# Patient Record
Sex: MALE | Race: WHITE | Employment: OTHER | URBAN - METROPOLITAN AREA
[De-identification: names, ages, dates, MRNs, and addresses within clinical notes are randomized per-mention and may not be internally consistent; named-entity substitution may affect disease eponyms.]

---

## 2017-04-07 ENCOUNTER — OFFICE VISIT (OUTPATIENT)
Dept: CARDIOLOGY CLINIC | Age: 67
End: 2017-04-07

## 2017-04-07 VITALS
HEIGHT: 69 IN | DIASTOLIC BLOOD PRESSURE: 100 MMHG | WEIGHT: 185 LBS | HEART RATE: 90 BPM | OXYGEN SATURATION: 98 % | BODY MASS INDEX: 27.4 KG/M2 | RESPIRATION RATE: 16 BRPM | SYSTOLIC BLOOD PRESSURE: 150 MMHG

## 2017-04-07 DIAGNOSIS — E78.5 DYSLIPIDEMIA: ICD-10-CM

## 2017-04-07 DIAGNOSIS — I25.119 CORONARY ARTERY DISEASE INVOLVING NATIVE CORONARY ARTERY WITH ANGINA PECTORIS, UNSPECIFIED WHETHER NATIVE OR TRANSPLANTED HEART (HCC): Primary | ICD-10-CM

## 2017-04-07 RX ORDER — SIMVASTATIN 20 MG/1
20 TABLET, FILM COATED ORAL
Qty: 90 TAB | Refills: 3 | Status: SHIPPED | OUTPATIENT
Start: 2017-04-07 | End: 2018-11-17

## 2017-04-07 RX ORDER — LANOLIN ALCOHOL/MO/W.PET/CERES
400 CREAM (GRAM) TOPICAL DAILY
COMMUNITY
End: 2021-03-09

## 2017-04-07 RX ORDER — BISMUTH SUBSALICYLATE 262 MG
1 TABLET,CHEWABLE ORAL DAILY
COMMUNITY

## 2017-04-07 NOTE — PROGRESS NOTES
Subjective:     Problem List  Date Reviewed: 4/7/2017          Codes Class Noted    CAD (coronary artery disease), native coronary artery ICD-10-CM: I25.10  ICD-9-CM: 414.01  10/28/2010    Overview Signed 11/15/2010  3:14 PM by Darrion Aceves MD     a. Cath (11/27/7): LM d20. LAD ost90, p95. RI p70. LCx p40. RCA p30; Right PDA ost30. EF 60% with mild mid/distal anterolat HK. No AVG/MR. Patent L SC.  b. CABG (11/28/7, Sunitha Bain): CARRINGTON-LAD, SVG-Diag, SVG-RI.  c. Echo (4/14/8): EF 60%. Mild MR/TR. PASP 35. Dyslipidemia ICD-10-CM: E78.5  ICD-9-CM: 272.4  10/28/2010    Overview Addendum 11/28/2011  1:43 PM by Darrion Aceves MD     a. FLP (11/4/9): Tot 142, TG 51, HDL 46, LDL 86 (Lipitor 10mg qd). B.  FLP (10/11/11): Tot 158, TG 68, HDL 56, LDL 88 (Zocor 20mg qd). H/O Nephrolithiasis ICD-10-CM: N20.0  ICD-9-CM: 592.0  11/15/2010              Mr. Shanda Nunes is a 77 y.o. man with the above past medical history, who presents for re-enrollment into a cardiology clinic. I last saw him about five years ago. He has been doing well from a cardiac standpoint. He denies any chest pain, chest discomfort, shortness of breath, dyspnea on exertion, orthopnea, paroxysmal nocturnal dyspnea, lower extremity swelling, palpitations, syncope or near syncope. History   Smoking Status    Never Smoker   Smokeless Tobacco    Not on file       Current Outpatient Prescriptions   Medication Sig Dispense Refill    magnesium oxide (MAG-OX) 400 mg tablet Take 400 mg by mouth daily.  multivitamin (ONE A DAY) tablet Take 1 Tab by mouth daily.  simvastatin (ZOCOR) 20 mg tablet Take 1 Tab by mouth nightly. 90 Tab 3    ascorbic acid (VITAMIN C) 500 mg tablet Take 1,000 mg by mouth.  aspirin (ASPIRIN) 325 mg tablet Take 325 mg by mouth daily.          Objective:     Visit Vitals    BP (!) 150/100 (BP 1 Location: Left arm, BP Patient Position: Sitting)    Pulse 90    Resp 16    Ht 5' 9\" (1.753 m)    Wt 185 lb (83.9 kg)    SpO2 98%    BMI 27.32 kg/m2       HEENT Exam:     Normocephalic, atraumatic. EOMI. Oropharynx negative. Neck supple. No lymphadenopathy. Lung Exam:     The patient is not dyspneic. There is no cough. The lungs are clear to percussion. Breath sounds are heard equally in all lung fields. There are no wheezes, rales, rhonchi, or rubs heard on auscultation. Heart Exam:     The rhythm is regular. The PMI is in the 5th intercostal space of the MCL. Apical impulse is normal. S1 is regular. S2 is physiologic. There is no S3, S4 gallop, murmur, click, or rub. Abdomen Exam:     Bowel sounds are normoactive. Abdomen benign. Extremities Exam:     The extremities are atraumatic appearing. There is no clubbing, cyanosis, edema, ulcers, varicose veins, rash, swelling, erythemia noted in the extremities. The neurovascular status is grossly intact with normal distal sensation and pulses. Vascular Exam:     The radial, brachial, dorsalis pedis, posterior tibial, are equal and strong bilaterally The carotids are equal bilaterally without bruits. EKG: NSR @ 90, no isch. Assessment/Plan:     Mr. Shanna Lowe overall appears stable from a cardiac standpoint. His blood pressure is elevated here today, but he says he has an automated cuff at home and his systolic blood pressure usually runs around 120 to 130/80's. He is going to come back in with his automated cuff to verify that it is accurate. I would like to see him again in one year's time for follow up. We are going to reassess his Simvastatin and we re going to check a fasting lipid profile with liver enzymes in the near future. We discussed diet and exercise. Plan:  1. Continue outpatient medication regimen. 2. Blood pressure check in the near future. 3. Fasting lipid profile with liver enzymes in the near future. 4. Follow up with me in one year's time. 5. Diet and exercise. 6. Call my office, call his primary care physician, or return to the hospital should any concerning symptomatology arise. Mr. Todd Cheung indicated that he understood this plan and wished to proceed ahead.             Patient Care Team:  BRITTANI Garber as PCP - General (Physician Assistant)  Tomy Jain MD [facility])

## 2017-04-07 NOTE — PATIENT INSTRUCTIONS
[employer] Activation    Thank you for requesting access to [employer]. Please follow the instructions below to securely access and download your online medical record. [employer] allows you to send messages to your doctor, view your test results, renew your prescriptions, schedule appointments, and more. How Do I Sign Up? 1. In your internet browser, go to [email or web address]  2. Click on the First Time User? Click Here link in the Sign In box. You will be redirect to the New Member Sign Up page. 3. Enter your [employer] Access Code exactly as it appears below. You will not need to use this code after youve completed the sign-up process. If you do not sign up before the expiration date, you must request a new code. [employer] Access Code: [email or web address]  Expires: 2017  2:32 PM (This is the date your [employer] access code will )    4. Enter the last four digits of your Social Security Number (xxxx) and Date of Birth (mm/dd/yyyy) as indicated and click Submit. You will be taken to the next sign-up page. 5. Create a [employer] ID. This will be your [employer] login ID and cannot be changed, so think of one that is secure and easy to remember. 6. Create a [employer] password. You can change your password at any time. 7. Enter your Password Reset Question and Answer. This can be used at a later time if you forget your password. 8. Enter your e-mail address. You will receive e-mail notification when new information is available in [address]. 9. Click Sign Up. You can now view and download portions of your medical record. 10. Click the Download Summary menu link to download a portable copy of your medical information. Additional Information    If you have questions, please visit the Frequently Asked Questions section of the [employer] website at [email or web address] Remember, [employer] is NOT to be used for urgent needs. For medical emergencies, dial 911.            Learning About Coronary Artery Disease (CAD)  What is coronary artery disease? Coronary artery disease (CAD) occurs when plaque builds up in the arteries that bring oxygen-rich blood to your heart. Plaque is a fatty substance made of cholesterol, calcium, and other substances in the blood. This process is called hardening of the arteries, or atherosclerosis. What happens when you have coronary artery disease? · Plaque may narrow the coronary arteries. Narrowed arteries cause poor blood flow. This can lead to angina symptoms such as chest pain or discomfort. If blood flow is completely blocked, you could have a heart attack. · You can slow CAD and reduce the risk of future problems by making changes in your lifestyle. These include quitting smoking and eating heart-healthy foods. · Treatments for CAD, along with changes in your lifestyle, can help you live a longer and healthier life. How can you prevent coronary artery disease? · Do not smoke. It may be the best thing you can do to prevent heart disease. If you need help quitting, talk to your doctor about stop-smoking programs and medicines. These can increase your chances of quitting for good. · Be active. Get at least 30 minutes of exercise on most days of the week. Walking is a good choice. You also may want to do other activities, such as running, swimming, cycling, or playing tennis or team sports. · Eat heart-healthy foods. Eat more fruits and vegetables and less foods that contain saturated and trans fats. Limit alcohol, sodium, and sweets. · Stay at a healthy weight. Lose weight if you need to. · Manage other health problems such as diabetes, high blood pressure, and high cholesterol. · Manage stress. Stress can hurt your heart. To keep stress low, talk about your problems and feelings. Don't keep your feelings hidden. · If you have talked about it with your doctor, take a low-dose aspirin every day.  Aspirin can help certain people lower their risk of a heart attack or stroke. But taking aspirin isn't right for everyone, because it can cause serious bleeding. Do not start taking daily aspirin unless your doctor knows about it. How is coronary artery disease treated? · Your doctor will suggest that you make lifestyle changes. For example, your doctor may ask you to eat healthy foods, quit smoking, lose extra weight, and be more active. · You will have to take medicines. · Your doctor may suggest a procedure to open narrowed or blocked arteries. This is called angioplasty. Or your doctor may suggest using healthy blood vessels to create detours around narrowed or blocked arteries. This is called bypass surgery. Follow-up care is a key part of your treatment and safety. Be sure to make and go to all appointments, and call your doctor if you are having problems. It's also a good idea to know your test results and keep a list of the medicines you take. Where can you learn more? Go to http://antonio-edith.info/. Enter [phone] in the search box to learn more about \"Learning About Coronary Artery Disease (CAD). \"  Current as of: January 27, 2016  Content Version: 11.2  © 4516-6487 "[employer]. Care instructions adapted under license by [employer] (which disclaims liability or warranty for this information). If you have questions about a medical condition or this instruction, always ask your healthcare professional. [address] any warranty or liability for your use of this information.

## 2017-04-07 NOTE — MR AVS SNAPSHOT
Visit Information Date & Time Provider Department Dept. Phone Encounter #  
 4/7/2017  2:40 PM Susie Toledo MD [facility] [phone] [id number] Your Appointments 4/14/2017  3:00 PM  
BLOOD PRESSURE with Susie Toledo MD  
[facility] ([facility] SCHEDULING) Appt Note: 1 WK BP  
 [address] [facility] [address]  
[phone]  
  
   
 [address] [facility] [address]  
  
    
 4/9/2018  3:00 PM  
ESTABLISHED PATIENT with Susie Toledo MD  
[facility] ([facility] SCHEDULING) Appt Note: ANNUAL  
 [address] [facility]  
[address] [facility] [address] [address] [address] Upcoming Health Maintenance Date Due Hepatitis C Screening 1950 DTaP/Tdap/Td series (1 - Tdap) 7/15/1971 FOBT Q 1 YEAR AGE 50-75 7/15/2000 ZOSTER VACCINE AGE 60> 7/15/2010 GLAUCOMA SCREENING Q2Y 7/15/2015 Pneumococcal 65+ Low/Medium Risk (1 of 2 - PCV13) 7/15/2015 MEDICARE YEARLY EXAM 7/15/2015 INFLUENZA AGE 9 TO ADULT 8/1/2016 Allergies as of 4/7/2017  Review Complete On: 4/7/2017 By: Susie Toledo MD  
 No Known Allergies Current Immunizations  Never Reviewed No immunizations on file. Not reviewed this visit You Were Diagnosed With   
  
 Codes Comments Coronary artery disease involving native coronary artery with angina pectoris, unspecified whether native or transplanted heart    -  Primary ICD-10-CM: I25.119 ICD-9-CM: 414.01, 413.9 Vitals BP Pulse Resp Height(growth percentile) Weight(growth percentile) SpO2  
 (!) 150/100 (BP 1 Location: Left arm, BP Patient Position: Sitting) 90 16 5' 9\" (1.753 m) 185 lb (83.9 kg) 98% BMI Smoking Status 27.32 kg/m2 Never Smoker Vitals History BMI and BSA Data Body Mass Index Body Surface Area  
 27.32 kg/m 2 2.02 m 2 Preferred Pharmacy Pharmacy Name Phone [facility] Shiraz LEYVA Marceline [phone] Your Updated Medication List  
  
   
This list is accurate as of: 4/7/17  2:53 PM.  Always use your most recent med list.  
  
  
  
  
 aspirin 325 mg tablet Commonly known as:  ASPIRIN Take 325 mg by mouth daily. magnesium oxide 400 mg tablet Commonly known as:  MAG-OX Take 400 mg by mouth daily. multivitamin tablet Commonly known as:  ONE A DAY Take 1 Tab by mouth daily. simvastatin 20 mg tablet Commonly known as:  ZOCOR Take 1 Tab by mouth nightly. VITAMIN C 500 mg tablet Generic drug:  ascorbic acid (vitamin C) Take 1,000 mg by mouth. We Performed the Following AMB POC EKG ROUTINE W/ 12 LEADS, INTER & REP [53079 CPT(R)] Introducing [facility] SERVICES! [facility] introduces [employer] patient portal. Now you can access parts of your medical record, email your doctor's office, and request medication refills online. 1. In your internet browser, go to [email or web address] 2. Click on the First Time User? Click Here link in the Sign In box. You will see the New Member Sign Up page. 3. Enter your [employer] Access Code exactly as it appears below. You will not need to use this code after youve completed the sign-up process. If you do not sign up before the expiration date, you must request a new code. · [employer] Access Code: [email or web address] Expires: 7/6/2017  2:32 PM 
 
4. Enter the last four digits of your Social Security Number (xxxx) and Date of Birth (mm/dd/yyyy) as indicated and click Submit. You will be taken to the next sign-up page. 5. Create a [employer] ID. This will be your [employer] login ID and cannot be changed, so think of one that is secure and easy to remember. 6. Create a [employer] password. You can change your password at any time. 7. Enter your Password Reset Question and Answer. This can be used at a later time if you forget your password. 8. Enter your e-mail address. You will receive e-mail notification when new information is available in [address] 9. Click Sign Up. You can now view and download portions of your medical record. 10. Click the Download Summary menu link to download a portable copy of your medical information. If you have questions, please visit the Frequently Asked Questions section of the [employer] website. Remember, [employer] is NOT to be used for urgent needs. For medical emergencies, dial 911. Now available from your iPhone and Android! Please provide this summary of care documentation to your next provider. Your primary care clinician is listed as Benjie Olmos. If you have any questions after today's visit, please call [phone].

## 2017-04-07 NOTE — PROGRESS NOTES
Visit Vitals    BP (!) 150/100 (BP 1 Location: Left arm, BP Patient Position: Sitting)    Pulse 89    Resp 16    Ht 5' 9\" (1.753 m)    Wt 185 lb (83.9 kg)    SpO2 98%    BMI 27.32 kg/m2

## 2017-04-14 ENCOUNTER — CLINICAL SUPPORT (OUTPATIENT)
Dept: CARDIOLOGY CLINIC | Age: 67
End: 2017-04-14

## 2017-04-14 VITALS
HEART RATE: 85 BPM | HEIGHT: 69 IN | OXYGEN SATURATION: 98 % | RESPIRATION RATE: 18 BRPM | SYSTOLIC BLOOD PRESSURE: 169 MMHG | DIASTOLIC BLOOD PRESSURE: 93 MMHG

## 2017-04-14 DIAGNOSIS — I25.10 CORONARY ARTERY DISEASE INVOLVING NATIVE CORONARY ARTERY OF NATIVE HEART WITHOUT ANGINA PECTORIS: Primary | ICD-10-CM

## 2017-04-19 ENCOUNTER — HOSPITAL ENCOUNTER (OUTPATIENT)
Dept: LAB | Age: 67
Discharge: HOME | End: 2017-04-19
Payer: MEDICARE

## 2017-04-19 PROCEDURE — 36415 COLL VENOUS BLD VENIPUNCTURE: CPT

## 2017-04-19 PROCEDURE — 80076 HEPATIC FUNCTION PANEL: CPT

## 2017-04-19 PROCEDURE — 80061 LIPID PANEL: CPT

## 2017-04-20 LAB
ALBUMIN SERPL-MCNC: 4.3 G/DL (ref 3.6–4.8)
ALP SERPL-CCNC: 65 IU/L (ref 39–117)
ALT SERPL-CCNC: 29 IU/L (ref 0–44)
AST SERPL-CCNC: 26 IU/L (ref 0–40)
BILIRUB DIRECT SERPL-MCNC: 0.18 MG/DL (ref 0–0.4)
BILIRUB SERPL-MCNC: 0.7 MG/DL (ref 0–1.2)
CHOLEST SERPL-MCNC: 170 MG/DL (ref 100–199)
HDLC SERPL-MCNC: 56 MG/DL
INTERPRETATION, 910389: NORMAL
LDLC SERPL CALC-MCNC: 94 MG/DL (ref 0–99)
PROT SERPL-MCNC: 6.8 G/DL (ref 6–8.5)
TRIGL SERPL-MCNC: 101 MG/DL (ref 0–149)
VLDLC SERPL CALC-MCNC: 20 MG/DL (ref 5–40)

## 2018-04-13 ENCOUNTER — OFFICE VISIT (OUTPATIENT)
Dept: CARDIOLOGY CLINIC | Age: 68
End: 2018-04-13

## 2018-04-13 VITALS
WEIGHT: 170 LBS | BODY MASS INDEX: 25.18 KG/M2 | HEART RATE: 94 BPM | DIASTOLIC BLOOD PRESSURE: 90 MMHG | SYSTOLIC BLOOD PRESSURE: 152 MMHG | HEIGHT: 69 IN

## 2018-04-13 DIAGNOSIS — E78.5 DYSLIPIDEMIA: ICD-10-CM

## 2018-04-13 DIAGNOSIS — I25.10 CORONARY ARTERY DISEASE INVOLVING NATIVE CORONARY ARTERY OF NATIVE HEART WITHOUT ANGINA PECTORIS: Primary | ICD-10-CM

## 2018-04-13 DIAGNOSIS — I10 ESSENTIAL HYPERTENSION: ICD-10-CM

## 2018-04-13 NOTE — PROGRESS NOTES
Tamica Gill MD. [facility]              Patient: Analy Apodaca  : 1950      Today's Date: 2018            HISTORY OF PRESENT ILLNESS:     History of Present Illness:  He is here for follow-up. Doing well. No complaints. No CP or SOB. Exercises. PAST MEDICAL HISTORY:     Past Medical History:   Diagnosis Date    CAD (coronary artery disease), native coronary artery 10/28/2010    Dyslipidemia 10/28/2010    Other and unspecified hyperlipidemia 10/28/2010    S/P CABG (status post coronary artery bypass graft) 10/28/2010             MEDICATIONS:     Current Outpatient Prescriptions   Medication Sig Dispense Refill    OTHER Indications: concoction of lemon,garlic, vinegar and christie      magnesium oxide (MAG-OX) 400 mg tablet Take 400 mg by mouth daily.  multivitamin (ONE A DAY) tablet Take 1 Tab by mouth daily.  simvastatin (ZOCOR) 20 mg tablet Take 1 Tab by mouth nightly. 90 Tab 3    ascorbic acid (VITAMIN C) 500 mg tablet Take 1,000 mg by mouth.  aspirin (ASPIRIN) 325 mg tablet Take 325 mg by mouth daily. No Known Allergies          SOCIAL HISTORY:     Social History   Substance Use Topics    Smoking status: Never Smoker    Smokeless tobacco: Never Used    Alcohol use No           FAMILY HISTORY:     Family History   Problem Relation Age of Onset    No Known Problems Mother                REVIEW OF SYMPTOMS:     Review of Symptoms:  Constitutional: Negative for fever, chills  HEENT: Negative for nosebleeds, tinnitus, and vision changes. Respiratory: Negative for cough, wheezing  Cardiovascular: Negative for orthopnea, claudication, syncope, and PND. Gastrointestinal: Negative for abdominal pain, diarrhea, melena. Genitourinary: Negative for dysuria  Musculoskeletal: Negative for myalgias. Skin: Negative for rash  Heme: No problems bleeding. Neurological: Negative for speech change and focal weakness.              PHYSICAL EXAM: Physical Exam:  Visit Vitals    /90    Pulse 94    Ht 5' 9\" (1.753 m)    Wt 170 lb (77.1 kg)    BMI 25.1 kg/m2     Patient appears generally well, mood and affect are appropriate and pleasant. HEENT:  Hearing intact, non-icteric, normocephalic, atraumatic. Neck Exam: Supple, No JVD or carotid bruits. Lung Exam: Clear to auscultation, even breath sounds. Cardiac Exam: Regular rate and rhythm with no murmur  Abdomen: Soft, non-tender, normal bowel sounds. No bruits or masses. Extremities: Moves all ext well. No lower extremity edema. Vascular: 2+ dorsalis pedis pulses bilaterally. Psych: Appropriate affect  Neuro - Grossly intact        LABS / OTHER STUDIES:       Lab Results   Component Value Date/Time    Bilirubin, total 0.7 04/19/2017 12:34 PM    AST (SGOT) 26 04/19/2017 12:34 PM    Alk. phosphatase 65 04/19/2017 12:34 PM    Protein, total 6.8 04/19/2017 12:34 PM    Albumin 4.3 04/19/2017 12:34 PM    ALT (SGPT) 29 04/19/2017 12:34 PM         Lab Results   Component Value Date/Time    Cholesterol, total 170 04/19/2017 12:34 PM    HDL Cholesterol 56 04/19/2017 12:34 PM    LDL, calculated 94 04/19/2017 12:34 PM    VLDL, calculated 20 04/19/2017 12:34 PM    Triglyceride 101 04/19/2017 12:34 PM               CARDIAC DIAGNOSTICS:     Cardiac Evaluation Includes:    a. Cath (11/27/7): LM d20. LAD ost90, p95. RI p70. LCx p40. RCA p30; Right PDA ost30. EF 60% with mild mid/distal anterolat HK. No AVG/MR. Patent L SC.  b. CABG (11/28/7, Maria G Dang): LIMA-LAD, SVG-Diag, SVG-RI.  c. Echo (4/14/8): EF 60%. Mild MR/TR. PASP 35. EKG 4/13/18 - NSR, normal           ASSESSMENT AND PLAN:     Assessment and Plan:  1) CAD (CABG 11/2007)   - Doing well without complaints.    - Exercises regularly   - Cont ASA and a statin     2) Dyslipidemia  - recheck labs   - cont statin     3) HTN   - BP high in the office, but he says BP OK at home  - He wants to work on diet and follow BP at home before adding meds  - See me in 2 months to reassess     4) Asked him to establish with a PCP. 5) See me back in two months. Patient expressed understanding of the plan - questions were answered. 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 Lorne Gutierrez MD, [address], [address] [place] [address]  Ph: [phone]    [phone]

## 2018-05-08 LAB
ALBUMIN SERPL-MCNC: 3.9 G/DL (ref 3.6–4.8)
ALBUMIN/GLOB SERPL: 1.5 {RATIO} (ref 1.2–2.2)
ALP SERPL-CCNC: 60 IU/L (ref 39–117)
ALT SERPL-CCNC: 21 IU/L (ref 0–44)
AST SERPL-CCNC: 22 IU/L (ref 0–40)
BILIRUB SERPL-MCNC: 0.6 MG/DL (ref 0–1.2)
BUN SERPL-MCNC: 16 MG/DL (ref 8–27)
BUN/CREAT SERPL: 16 (ref 10–24)
CALCIUM SERPL-MCNC: 9.1 MG/DL (ref 8.6–10.2)
CHLORIDE SERPL-SCNC: 100 MMOL/L (ref 96–106)
CHOLEST SERPL-MCNC: 160 MG/DL (ref 100–199)
CO2 SERPL-SCNC: 27 MMOL/L (ref 18–29)
CREAT SERPL-MCNC: 1.03 MG/DL (ref 0.76–1.27)
ERYTHROCYTE [DISTWIDTH] IN BLOOD BY AUTOMATED COUNT: 14.1 % (ref 12.3–15.4)
GFR SERPLBLD CREATININE-BSD FMLA CKD-EPI: 75 ML/MIN/1.73
GFR SERPLBLD CREATININE-BSD FMLA CKD-EPI: 86 ML/MIN/1.73
GLOBULIN SER CALC-MCNC: 2.6 G/DL (ref 1.5–4.5)
GLUCOSE SERPL-MCNC: 95 MG/DL (ref 65–99)
HCT VFR BLD AUTO: 44.4 % (ref 37.5–51)
HDLC SERPL-MCNC: 55 MG/DL
HGB BLD-MCNC: 15.3 G/DL (ref 13–17.7)
INTERPRETATION, 910389: NORMAL
LDLC SERPL CALC-MCNC: 90 MG/DL (ref 0–99)
MCH RBC QN AUTO: 31.7 PG (ref 26.6–33)
MCHC RBC AUTO-ENTMCNC: 34.5 G/DL (ref 31.5–35.7)
MCV RBC AUTO: 92 FL (ref 79–97)
PLATELET # BLD AUTO: 235 X10E3/UL (ref 150–379)
POTASSIUM SERPL-SCNC: 4.4 MMOL/L (ref 3.5–5.2)
PROT SERPL-MCNC: 6.5 G/DL (ref 6–8.5)
RBC # BLD AUTO: 4.83 X10E6/UL (ref 4.14–5.8)
SODIUM SERPL-SCNC: 140 MMOL/L (ref 134–144)
TRIGL SERPL-MCNC: 76 MG/DL (ref 0–149)
TSH SERPL DL<=0.005 MIU/L-ACNC: 1.45 UIU/ML (ref 0.45–4.5)
VLDLC SERPL CALC-MCNC: 15 MG/DL (ref 5–40)
WBC # BLD AUTO: 5.9 X10E3/UL (ref 3.4–10.8)

## 2018-11-17 ENCOUNTER — APPOINTMENT (OUTPATIENT)
Dept: GENERAL RADIOLOGY | Age: 68
End: 2018-11-17
Attending: EMERGENCY MEDICINE
Payer: MEDICARE

## 2018-11-17 ENCOUNTER — HOSPITAL ENCOUNTER (OUTPATIENT)
Age: 68
Setting detail: OBSERVATION
Discharge: HOME | End: 2018-11-19
Attending: EMERGENCY MEDICINE | Admitting: INTERNAL MEDICINE
Payer: MEDICARE

## 2018-11-17 DIAGNOSIS — I25.119 CORONARY ARTERY DISEASE INVOLVING NATIVE HEART WITH ANGINA PECTORIS, UNSPECIFIED VESSEL OR LESION TYPE (HCC): ICD-10-CM

## 2018-11-17 DIAGNOSIS — I10 HYPERTENSION, UNSPECIFIED TYPE: ICD-10-CM

## 2018-11-17 DIAGNOSIS — R07.9 CHEST PAIN, UNSPECIFIED TYPE: Primary | ICD-10-CM

## 2018-11-17 PROBLEM — I20.8 ANGINA AT REST (HCC): Status: ACTIVE | Noted: 2018-11-17

## 2018-11-17 LAB
ALBUMIN SERPL-MCNC: 3.8 G/DL (ref 3.5–5)
ALBUMIN/GLOB SERPL: 0.9 {RATIO} (ref 1.1–2.2)
ALP SERPL-CCNC: 93 U/L (ref 45–117)
ALT SERPL-CCNC: 31 U/L (ref 12–78)
ANION GAP SERPL CALC-SCNC: 10 MMOL/L (ref 5–15)
APTT PPP: 26.6 SEC (ref 22.1–32)
AST SERPL-CCNC: 22 U/L (ref 15–37)
BASOPHILS # BLD: 0.1 K/UL (ref 0–0.1)
BASOPHILS NFR BLD: 1 % (ref 0–1)
BILIRUB SERPL-MCNC: 0.3 MG/DL (ref 0.2–1)
BUN SERPL-MCNC: 17 MG/DL (ref 6–20)
BUN/CREAT SERPL: 20 (ref 12–20)
CALCIUM SERPL-MCNC: 8.9 MG/DL (ref 8.5–10.1)
CHLORIDE SERPL-SCNC: 102 MMOL/L (ref 97–108)
CO2 SERPL-SCNC: 27 MMOL/L (ref 21–32)
CREAT SERPL-MCNC: 0.87 MG/DL (ref 0.7–1.3)
DIFFERENTIAL METHOD BLD: NORMAL
EOSINOPHIL # BLD: 0.1 K/UL (ref 0–0.4)
EOSINOPHIL NFR BLD: 1 % (ref 0–7)
ERYTHROCYTE [DISTWIDTH] IN BLOOD BY AUTOMATED COUNT: 12.6 % (ref 11.5–14.5)
GLOBULIN SER CALC-MCNC: 4.2 G/DL (ref 2–4)
GLUCOSE SERPL-MCNC: 155 MG/DL (ref 65–100)
HCT VFR BLD AUTO: 46.7 % (ref 36.6–50.3)
HGB BLD-MCNC: 16 G/DL (ref 12.1–17)
IMM GRANULOCYTES # BLD: 0 K/UL (ref 0–0.04)
IMM GRANULOCYTES NFR BLD AUTO: 0 % (ref 0–0.5)
INR PPP: 0.9 (ref 0.9–1.1)
LIPASE SERPL-CCNC: 126 U/L (ref 73–393)
LYMPHOCYTES # BLD: 2.2 K/UL (ref 0.8–3.5)
LYMPHOCYTES NFR BLD: 26 % (ref 12–49)
MCH RBC QN AUTO: 31.6 PG (ref 26–34)
MCHC RBC AUTO-ENTMCNC: 34.3 G/DL (ref 30–36.5)
MCV RBC AUTO: 92.3 FL (ref 80–99)
MONOCYTES # BLD: 0.5 K/UL (ref 0–1)
MONOCYTES NFR BLD: 6 % (ref 5–13)
NEUTS SEG # BLD: 5.3 K/UL (ref 1.8–8)
NEUTS SEG NFR BLD: 65 % (ref 32–75)
NRBC # BLD: 0 K/UL (ref 0–0.01)
NRBC BLD-RTO: 0 PER 100 WBC
PLATELET # BLD AUTO: 233 K/UL (ref 150–400)
PMV BLD AUTO: 8.9 FL (ref 8.9–12.9)
POTASSIUM SERPL-SCNC: 3.7 MMOL/L (ref 3.5–5.1)
PROT SERPL-MCNC: 8 G/DL (ref 6.4–8.2)
PROTHROMBIN TIME: 9.6 SEC (ref 9–11.1)
RBC # BLD AUTO: 5.06 M/UL (ref 4.1–5.7)
SODIUM SERPL-SCNC: 139 MMOL/L (ref 136–145)
THERAPEUTIC RANGE,PTTT: NORMAL SECS (ref 58–77)
TROPONIN I BLD-MCNC: <0.04 NG/ML (ref 0–0.08)
WBC # BLD AUTO: 8.1 K/UL (ref 4.1–11.1)

## 2018-11-17 PROCEDURE — 65390000012 HC CONDITION CODE 44 OBSERVATION

## 2018-11-17 PROCEDURE — 74011000250 HC RX REV CODE- 250: Performed by: INTERNAL MEDICINE

## 2018-11-17 PROCEDURE — 74011250637 HC RX REV CODE- 250/637: Performed by: EMERGENCY MEDICINE

## 2018-11-17 PROCEDURE — 74011250637 HC RX REV CODE- 250/637: Performed by: INTERNAL MEDICINE

## 2018-11-17 PROCEDURE — 94762 N-INVAS EAR/PLS OXIMTRY CONT: CPT

## 2018-11-17 PROCEDURE — 80053 COMPREHEN METABOLIC PANEL: CPT

## 2018-11-17 PROCEDURE — 96374 THER/PROPH/DIAG INJ IV PUSH: CPT

## 2018-11-17 PROCEDURE — 71046 X-RAY EXAM CHEST 2 VIEWS: CPT

## 2018-11-17 PROCEDURE — 93005 ELECTROCARDIOGRAM TRACING: CPT

## 2018-11-17 PROCEDURE — 99285 EMERGENCY DEPT VISIT HI MDM: CPT

## 2018-11-17 PROCEDURE — 65270000029 HC RM PRIVATE

## 2018-11-17 PROCEDURE — 83690 ASSAY OF LIPASE: CPT

## 2018-11-17 PROCEDURE — 85730 THROMBOPLASTIN TIME PARTIAL: CPT

## 2018-11-17 PROCEDURE — 85610 PROTHROMBIN TIME: CPT

## 2018-11-17 PROCEDURE — 84484 ASSAY OF TROPONIN QUANT: CPT

## 2018-11-17 PROCEDURE — 85025 COMPLETE CBC W/AUTO DIFF WBC: CPT

## 2018-11-17 PROCEDURE — 36415 COLL VENOUS BLD VENIPUNCTURE: CPT

## 2018-11-17 RX ORDER — MORPHINE SULFATE 4 MG/ML
2 INJECTION INTRAVENOUS
Status: DISCONTINUED | OUTPATIENT
Start: 2018-11-17 | End: 2018-11-19 | Stop reason: HOSPADM

## 2018-11-17 RX ORDER — ACETAMINOPHEN 325 MG/1
650 TABLET ORAL
Status: DISCONTINUED | OUTPATIENT
Start: 2018-11-17 | End: 2018-11-19 | Stop reason: HOSPADM

## 2018-11-17 RX ORDER — SODIUM CHLORIDE 0.9 % (FLUSH) 0.9 %
5-10 SYRINGE (ML) INJECTION AS NEEDED
Status: DISCONTINUED | OUTPATIENT
Start: 2018-11-17 | End: 2018-11-19 | Stop reason: HOSPADM

## 2018-11-17 RX ORDER — DIPHENHYDRAMINE HYDROCHLORIDE 50 MG/ML
12.5 INJECTION, SOLUTION INTRAMUSCULAR; INTRAVENOUS
Status: DISCONTINUED | OUTPATIENT
Start: 2018-11-17 | End: 2018-11-19 | Stop reason: HOSPADM

## 2018-11-17 RX ORDER — HYDROCODONE BITARTRATE AND ACETAMINOPHEN 5; 325 MG/1; MG/1
1 TABLET ORAL
Status: DISCONTINUED | OUTPATIENT
Start: 2018-11-17 | End: 2018-11-19 | Stop reason: HOSPADM

## 2018-11-17 RX ORDER — ENOXAPARIN SODIUM 100 MG/ML
40 INJECTION SUBCUTANEOUS EVERY 24 HOURS
Status: DISCONTINUED | OUTPATIENT
Start: 2018-11-18 | End: 2018-11-19 | Stop reason: HOSPADM

## 2018-11-17 RX ORDER — NALOXONE HYDROCHLORIDE 0.4 MG/ML
0.4 INJECTION, SOLUTION INTRAMUSCULAR; INTRAVENOUS; SUBCUTANEOUS AS NEEDED
Status: DISCONTINUED | OUTPATIENT
Start: 2018-11-17 | End: 2018-11-19 | Stop reason: HOSPADM

## 2018-11-17 RX ORDER — SODIUM CHLORIDE 0.9 % (FLUSH) 0.9 %
5-10 SYRINGE (ML) INJECTION EVERY 8 HOURS
Status: DISCONTINUED | OUTPATIENT
Start: 2018-11-17 | End: 2018-11-19 | Stop reason: HOSPADM

## 2018-11-17 RX ORDER — LABETALOL HYDROCHLORIDE 5 MG/ML
20 INJECTION, SOLUTION INTRAVENOUS
Status: DISCONTINUED | OUTPATIENT
Start: 2018-11-17 | End: 2018-11-19 | Stop reason: HOSPADM

## 2018-11-17 RX ORDER — METOPROLOL TARTRATE 25 MG/1
25 TABLET, FILM COATED ORAL EVERY 12 HOURS
Status: DISCONTINUED | OUTPATIENT
Start: 2018-11-17 | End: 2018-11-19

## 2018-11-17 RX ORDER — ONDANSETRON 2 MG/ML
4 INJECTION INTRAMUSCULAR; INTRAVENOUS
Status: DISCONTINUED | OUTPATIENT
Start: 2018-11-17 | End: 2018-11-19 | Stop reason: HOSPADM

## 2018-11-17 RX ORDER — ASPIRIN 325 MG
325 TABLET ORAL DAILY
Status: DISCONTINUED | OUTPATIENT
Start: 2018-11-18 | End: 2018-11-19 | Stop reason: HOSPADM

## 2018-11-17 RX ORDER — DOCUSATE SODIUM 100 MG/1
100 CAPSULE, LIQUID FILLED ORAL 2 TIMES DAILY
Status: DISCONTINUED | OUTPATIENT
Start: 2018-11-18 | End: 2018-11-19 | Stop reason: HOSPADM

## 2018-11-17 RX ADMIN — Medication 10 ML: at 23:15

## 2018-11-17 RX ADMIN — NITROGLYCERIN 1 INCH: 20 OINTMENT TOPICAL at 20:43

## 2018-11-17 RX ADMIN — METOPROLOL TARTRATE 25 MG: 25 TABLET ORAL at 21:33

## 2018-11-17 RX ADMIN — LABETALOL HYDROCHLORIDE 20 MG: 5 INJECTION INTRAVENOUS at 21:33

## 2018-11-18 PROBLEM — R07.9 CHEST PAIN: Status: ACTIVE | Noted: 2018-11-18

## 2018-11-18 LAB
ALBUMIN SERPL-MCNC: 3.4 G/DL (ref 3.5–5)
ALBUMIN/GLOB SERPL: 1 {RATIO} (ref 1.1–2.2)
ALP SERPL-CCNC: 77 U/L (ref 45–117)
ALT SERPL-CCNC: 26 U/L (ref 12–78)
ANION GAP SERPL CALC-SCNC: 9 MMOL/L (ref 5–15)
AST SERPL-CCNC: 21 U/L (ref 15–37)
ATRIAL RATE: 90 BPM
BILIRUB DIRECT SERPL-MCNC: 0.1 MG/DL (ref 0–0.2)
BILIRUB SERPL-MCNC: 0.4 MG/DL (ref 0.2–1)
BUN SERPL-MCNC: 14 MG/DL (ref 6–20)
BUN/CREAT SERPL: 19 (ref 12–20)
CALCIUM SERPL-MCNC: 8.6 MG/DL (ref 8.5–10.1)
CALCULATED P AXIS, ECG09: 27 DEGREES
CALCULATED R AXIS, ECG10: 8 DEGREES
CALCULATED T AXIS, ECG11: 63 DEGREES
CHLORIDE SERPL-SCNC: 104 MMOL/L (ref 97–108)
CHOLEST SERPL-MCNC: 219 MG/DL
CK MB CFR SERPL CALC: 1.5 % (ref 0–2.5)
CK MB SERPL-MCNC: 1.2 NG/ML (ref 5–25)
CK SERPL-CCNC: 80 U/L (ref 39–308)
CO2 SERPL-SCNC: 26 MMOL/L (ref 21–32)
CREAT SERPL-MCNC: 0.72 MG/DL (ref 0.7–1.3)
DIAGNOSIS, 93000: NORMAL
ERYTHROCYTE [DISTWIDTH] IN BLOOD BY AUTOMATED COUNT: 12.7 % (ref 11.5–14.5)
EST. AVERAGE GLUCOSE BLD GHB EST-MCNC: 114 MG/DL
GLOBULIN SER CALC-MCNC: 3.5 G/DL (ref 2–4)
GLUCOSE SERPL-MCNC: 111 MG/DL (ref 65–100)
HBA1C MFR BLD: 5.6 % (ref 4.2–6.3)
HCT VFR BLD AUTO: 42.5 % (ref 36.6–50.3)
HDLC SERPL-MCNC: 57 MG/DL
HDLC SERPL: 3.8 {RATIO} (ref 0–5)
HGB BLD-MCNC: 14.7 G/DL (ref 12.1–17)
LDLC SERPL CALC-MCNC: 147 MG/DL (ref 0–100)
LIPID PROFILE,FLP: ABNORMAL
MAGNESIUM SERPL-MCNC: 2.1 MG/DL (ref 1.6–2.4)
MCH RBC QN AUTO: 31.6 PG (ref 26–34)
MCHC RBC AUTO-ENTMCNC: 34.6 G/DL (ref 30–36.5)
MCV RBC AUTO: 91.4 FL (ref 80–99)
NRBC # BLD: 0 K/UL (ref 0–0.01)
NRBC BLD-RTO: 0 PER 100 WBC
P-R INTERVAL, ECG05: 172 MS
PHOSPHATE SERPL-MCNC: 3.5 MG/DL (ref 2.6–4.7)
PLATELET # BLD AUTO: 214 K/UL (ref 150–400)
PMV BLD AUTO: 9 FL (ref 8.9–12.9)
POTASSIUM SERPL-SCNC: 4 MMOL/L (ref 3.5–5.1)
PROT SERPL-MCNC: 6.9 G/DL (ref 6.4–8.2)
Q-T INTERVAL, ECG07: 376 MS
QRS DURATION, ECG06: 108 MS
QTC CALCULATION (BEZET), ECG08: 459 MS
RBC # BLD AUTO: 4.65 M/UL (ref 4.1–5.7)
SODIUM SERPL-SCNC: 139 MMOL/L (ref 136–145)
TRIGL SERPL-MCNC: 75 MG/DL (ref ?–150)
TROPONIN I SERPL-MCNC: 0.07 NG/ML
TROPONIN I SERPL-MCNC: 0.07 NG/ML
VENTRICULAR RATE, ECG03: 90 BPM
VLDLC SERPL CALC-MCNC: 15 MG/DL
WBC # BLD AUTO: 7.4 K/UL (ref 4.1–11.1)

## 2018-11-18 PROCEDURE — 82550 ASSAY OF CK (CPK): CPT

## 2018-11-18 PROCEDURE — 99218 HC RM OBSERVATION: CPT

## 2018-11-18 PROCEDURE — 74011250637 HC RX REV CODE- 250/637: Performed by: INTERNAL MEDICINE

## 2018-11-18 PROCEDURE — 36415 COLL VENOUS BLD VENIPUNCTURE: CPT

## 2018-11-18 PROCEDURE — 74011250636 HC RX REV CODE- 250/636: Performed by: INTERNAL MEDICINE

## 2018-11-18 PROCEDURE — 65390000012 HC CONDITION CODE 44 OBSERVATION

## 2018-11-18 PROCEDURE — 80048 BASIC METABOLIC PNL TOTAL CA: CPT

## 2018-11-18 PROCEDURE — 83735 ASSAY OF MAGNESIUM: CPT

## 2018-11-18 PROCEDURE — 80076 HEPATIC FUNCTION PANEL: CPT

## 2018-11-18 PROCEDURE — 96372 THER/PROPH/DIAG INJ SC/IM: CPT

## 2018-11-18 PROCEDURE — 85027 COMPLETE CBC AUTOMATED: CPT

## 2018-11-18 PROCEDURE — 84100 ASSAY OF PHOSPHORUS: CPT

## 2018-11-18 PROCEDURE — G8980 MOBILITY D/C STATUS: HCPCS

## 2018-11-18 PROCEDURE — 97161 PT EVAL LOW COMPLEX 20 MIN: CPT

## 2018-11-18 PROCEDURE — 83036 HEMOGLOBIN GLYCOSYLATED A1C: CPT

## 2018-11-18 PROCEDURE — 80061 LIPID PANEL: CPT

## 2018-11-18 PROCEDURE — G8979 MOBILITY GOAL STATUS: HCPCS

## 2018-11-18 PROCEDURE — G8978 MOBILITY CURRENT STATUS: HCPCS

## 2018-11-18 PROCEDURE — 84484 ASSAY OF TROPONIN QUANT: CPT

## 2018-11-18 RX ORDER — ATORVASTATIN CALCIUM 20 MG/1
40 TABLET, FILM COATED ORAL DAILY
Status: DISCONTINUED | OUTPATIENT
Start: 2018-11-18 | End: 2018-11-19 | Stop reason: HOSPADM

## 2018-11-18 RX ADMIN — ATORVASTATIN CALCIUM 40 MG: 20 TABLET, FILM COATED ORAL at 17:59

## 2018-11-18 RX ADMIN — Medication 10 ML: at 21:30

## 2018-11-18 RX ADMIN — METOPROLOL TARTRATE 25 MG: 25 TABLET ORAL at 08:55

## 2018-11-18 RX ADMIN — ENOXAPARIN SODIUM 40 MG: 40 INJECTION SUBCUTANEOUS at 08:55

## 2018-11-18 RX ADMIN — Medication 10 ML: at 17:59

## 2018-11-18 RX ADMIN — DOCUSATE SODIUM 100 MG: 100 CAPSULE, LIQUID FILLED ORAL at 08:55

## 2018-11-18 RX ADMIN — METOPROLOL TARTRATE 25 MG: 25 TABLET ORAL at 20:00

## 2018-11-18 RX ADMIN — ASPIRIN 325 MG: 325 TABLET, COATED ORAL at 08:55

## 2018-11-18 RX ADMIN — DOCUSATE SODIUM 100 MG: 100 CAPSULE, LIQUID FILLED ORAL at 17:59

## 2018-11-18 NOTE — PROGRESS NOTES
[facility] Medical Progress Note NAME: Taj Lacy :  1950 MRM:  [id number] Date/Time: 2018  2:54 PM 
 
  
Assessment and Plan:  
 
 75 yo hx of HTN, CAD s/p CABG, presented w/ chest pain concerning for angina   
 1) Angina at rest/CAD: has CABG 12 years ago, but stopped taking statin, BB. Angina with exertion for past week. Initial EKG and enzymes neg, and now with mild troponinemia but no further pain. Repeat troponin and if uptrending sharply or repeat pain, will anticoagulate. Awaiting cardiology consult. TTE pending. Metoprolol added back. Continue ASA. LDL not at goal, restart high potency statin. NPO at MN 
  
2) Dyslipidemia: patient stopped simvastatin several months ago. LDL greater than 140. Restart statin.   
3) Uncontrolled HTN: Restart metoprolol. Use IV labetalol prn. Will likely need a secondary agent but can titrate slowly Subjective: Chief Complaint:  Patient seen and examined. Chart reviewed. Denies any further chest pain ROS: 
(bold if positive, if negative) Tolerating PT  Tolerating Diet Objective:  
 
Last 24hrs VS reviewed since prior progress note. Most recent are: 
 
Visit Vitals BP (!) 143/91 (BP 1 Location: Left arm, BP Patient Position: At rest;Supine; Head of bed elevated (Comment degrees)) Pulse 74 Temp 97.7 °F (36.5 °C) Resp 16 Ht 5' 9\" (1.753 m) Wt 83.9 kg (185 lb) SpO2 95% BMI 27.32 kg/m² SpO2 Readings from Last 6 Encounters:  
18 95% 17 98% 17 98% No intake or output data in the 24 hours ending 18 5404 Physical Exam: 
 
Gen:  Well-developed, well-nourished, in no acute distress HEENT:  Pink conjunctivae, PERRL, hearing intact to voice, moist mucous membranes Neck:  Supple, without masses, thyroid non-tender Resp:  No accessory muscle use, clear breath sounds without wheezes rales or rhonchi Card:  No murmurs, normal S1, S2 without thrills, bruits or peripheral edema Abd:  Soft, non-tender, non-distended, normoactive bowel sounds are present, no palpable organomegaly and no detectable hernias Lymph:  No cervical or inguinal adenopathy Musc:  No cyanosis or clubbing Skin:  No rashes or ulcers, skin turgor is good Neuro:  Cranial nerves are grossly intact, no focal motor weakness, follows commands appropriately Psych:  Good insight, oriented to person, place and time, alert Telemetry reviewed:   normal sinus rhythm 
__________________________________________________________________ Medications Reviewed: (see below) Medications:  
 
Current Facility-Administered Medications Medication Dose Route Frequency  labetalol (NORMODYNE;TRANDATE) injection 20 mg  20 mg IntraVENous Q4H PRN  
 nitroglycerin (NITROBID) 2 % ointment 0.5 Inch  0.5 Inch Topical BID PRN  
 metoprolol tartrate (LOPRESSOR) tablet 25 mg  25 mg Oral Q12H  
 sodium chloride (NS) flush 5-10 mL  5-10 mL IntraVENous Q8H  
 sodium chloride (NS) flush 5-10 mL  5-10 mL IntraVENous PRN  
 acetaminophen (TYLENOL) tablet 650 mg  650 mg Oral Q4H PRN  
 HYDROcodone-acetaminophen (NORCO) 5-325 mg per tablet 1 Tab  1 Tab Oral Q4H PRN  
 naloxone (NARCAN) injection 0.4 mg  0.4 mg IntraVENous PRN  
 diphenhydrAMINE (BENADRYL) injection 12.5 mg  12.5 mg IntraVENous Q4H PRN  
 ondansetron (ZOFRAN) injection 4 mg  4 mg IntraVENous Q6H PRN  
 docusate sodium (COLACE) capsule 100 mg  100 mg Oral BID  morphine injection 2 mg  2 mg IntraVENous Q4H PRN  
 enoxaparin (LOVENOX) injection 40 mg  40 mg SubCUTAneous Q24H  
 aspirin tablet 325 mg  325 mg Oral DAILY Lab Data Reviewed: (see below) Lab Review:  
 
Recent Labs 11/18/18 
0335 11/17/18 2012 WBC 7.4 8.1 HGB 14.7 16.0 HCT 42.5 46.7  233 Recent Labs 11/18/18 
0335 11/17/18 2012  139  
K 4.0 3.7  102 CO2 26 27 * 155* BUN 14 17 CREA 0.72 0.87 CA 8.6 8.9 MG 2.1  --   
PHOS 3.5  --   
ALB 3.4* 3.8 TBILI 0.4 0.3 SGOT 21 22 ALT 26 31 INR  --  0.9 No results found for: Marilyn Mera No results for input(s): PH, PCO2, PO2, HCO3, FIO2 in the last 72 hours. Recent Labs 11/17/18 2012 INR 0.9 All Micro Results None I have reviewed notes of prior 24hr. Other pertinent lab: None Total time spent with patient: 35 min Care Plan discussed with: Patient and Nursing Staff Discussed:  Care Plan Prophylaxis:  Lovenox Disposition:  Home w/Family 
        
___________________________________________________ Attending Physician: Teresa Pitts DO

## 2018-11-18 NOTE — ED NOTES
Troponin of 0.07 noted, Dr. Dixon Willams notified, no new orders received at this time. Pt in no acute distress and denies pain.

## 2018-11-18 NOTE — CONSULTS
Kaveh Redding MD. Formerly [facility] [place]              Patient: Mikhail Velez  : 1950      Today's Date: 2018        HISTORY OF PRESENT ILLNESS:     History of Present Illness:  Reason for consult: angina     The patient is a 75 yo hx of HTN, CAD s/p CABG, presented w/ chest pain concerning for angina. The patient had a CABG 12 years ago and has been doing well until recently. Over the past week or so, he started to have midsternal chest pain with exertion. The pain comes on sometimes with heavy exertion and sometimes with less. It is a moderate pressure associated with dyspnea and better with rest.  He denies any rest pain. In the ED, initial EKG and trop were normal.   He does note he stopped his statin months ago since he was worried about risks associated with it (denies myalgias). PAST MEDICAL HISTORY:     Past Medical History:   Diagnosis Date    CAD (coronary artery disease), native coronary artery 10/28/2010    Dyslipidemia 10/28/2010    Other and unspecified hyperlipidemia 10/28/2010    S/P CABG (status post coronary artery bypass graft) 10/28/2010         MEDICATIONS:     Prior to Admission Medications:   Prior to Admission Medications   Prescriptions Last Dose Informant Patient Reported? Taking? OTHER 11/10/2018 at Unknown time   Yes Yes   Sig: Mixture of lemon,garlic, vinegar and christie - takes daily   Omega-3 Fatty Acids (FISH OIL) 500 mg cap 11/10/2018 at Unknown time   Yes Yes   Sig: Take 1,000 mg by mouth daily. ascorbic acid (VITAMIN C) 500 mg tablet 2018 at am   Yes Yes   Sig: Take 1,000 mg by mouth daily. aspirin (ASPIRIN) 325 mg tablet 2018 at 1800   Yes Yes   Sig: Take 325 mg by mouth daily. garlic cap  at Unknown time   Yes Yes   Sig: Take 1 Cap by mouth daily. magnesium oxide (MAG-OX) 400 mg tablet 11/15/2018 at am   Yes Yes   Sig: Take 400 mg by mouth daily.    multivitamin (ONE A DAY) tablet 2018 at am   Yes Yes   Sig: Take 1 Tab by mouth daily. Facility-Administered Medications: None            No Known Allergies          SOCIAL HISTORY:     Social History     Tobacco Use    Smoking status: Never Smoker    Smokeless tobacco: Never Used   Substance Use Topics    Alcohol use: No    Drug use: Not on file           FAMILY HISTORY:     Family History   Problem Relation Age of Onset    No Known Problems Mother     Hypertension Father              REVIEW OF SYMPTOMS:     Review of Symptoms:  Constitutional: Negative for fever, chills  HEENT: Negative for nosebleeds, tinnitus, and vision changes. Respiratory: Negative for cough, wheezing  Cardiovascular: Negative for orthopnea, claudication, syncope, and PND. Gastrointestinal: Negative for abdominal pain, diarrhea, melena. Genitourinary: Negative for dysuria  Musculoskeletal: Negative for myalgias. Skin: Negative for rash  Heme: No problems bleeding. Neurological: Negative for speech change and focal weakness. PHYSICAL EXAM:     Physical Exam:  Visit Vitals  /87 (BP 1 Location: Left arm, BP Patient Position: At rest)   Pulse 72   Temp 98.2 °F (36.8 °C)   Resp 16   Ht 5' 9\" (1.753 m)   Wt 185 lb (83.9 kg)   SpO2 95%   BMI 27.32 kg/m²     Patient appears generally well, mood and affect are appropriate and pleasant. HEENT:  Hearing intact, non-icteric, normocephalic, atraumatic. Neck Exam: Supple, No JVD or carotid bruits. Lung Exam: Clear to auscultation, even breath sounds. Cardiac Exam: Regular rate and rhythm with no murmur  Abdomen: Soft, non-tender, normal bowel sounds. No bruits or masses. Extremities: Moves all ext well. No lower extremity edema. Vascular: 2+ dorsalis pedis pulses bilaterally.   Psych: Appropriate affect  Neuro - Grossly intact      LABS / OTHER STUDIES:       Recent Results (from the past 24 hour(s))   CBC WITH AUTOMATED DIFF    Collection Time: 11/17/18  8:12 PM   Result Value Ref Range    WBC 8.1 4.1 - 11.1 K/uL    RBC 5.06 4.10 - 5.70 M/uL    HGB 16.0 12.1 - 17.0 g/dL    HCT 46.7 36.6 - 50.3 %    MCV 92.3 80.0 - 99.0 FL    MCH 31.6 26.0 - 34.0 PG    MCHC 34.3 30.0 - 36.5 g/dL    RDW 12.6 11.5 - 14.5 %    PLATELET 533 874 - 061 K/uL    MPV 8.9 8.9 - 12.9 FL    NRBC 0.0 0  WBC    ABSOLUTE NRBC 0.00 0.00 - 0.01 K/uL    NEUTROPHILS 65 32 - 75 %    LYMPHOCYTES 26 12 - 49 %    MONOCYTES 6 5 - 13 %    EOSINOPHILS 1 0 - 7 %    BASOPHILS 1 0 - 1 %    IMMATURE GRANULOCYTES 0 0.0 - 0.5 %    ABS. NEUTROPHILS 5.3 1.8 - 8.0 K/UL    ABS. LYMPHOCYTES 2.2 0.8 - 3.5 K/UL    ABS. MONOCYTES 0.5 0.0 - 1.0 K/UL    ABS. EOSINOPHILS 0.1 0.0 - 0.4 K/UL    ABS. BASOPHILS 0.1 0.0 - 0.1 K/UL    ABS. IMM. GRANS. 0.0 0.00 - 0.04 K/UL    DF AUTOMATED     METABOLIC PANEL, COMPREHENSIVE    Collection Time: 11/17/18  8:12 PM   Result Value Ref Range    Sodium 139 136 - 145 mmol/L    Potassium 3.7 3.5 - 5.1 mmol/L    Chloride 102 97 - 108 mmol/L    CO2 27 21 - 32 mmol/L    Anion gap 10 5 - 15 mmol/L    Glucose 155 (H) 65 - 100 mg/dL    BUN 17 6 - 20 MG/DL    Creatinine 0.87 0.70 - 1.30 MG/DL    BUN/Creatinine ratio 20 12 - 20      GFR est AA >60 >60 ml/min/1.73m2    GFR est non-AA >60 >60 ml/min/1.73m2    Calcium 8.9 8.5 - 10.1 MG/DL    Bilirubin, total 0.3 0.2 - 1.0 MG/DL    ALT (SGPT) 31 12 - 78 U/L    AST (SGOT) 22 15 - 37 U/L    Alk.  phosphatase 93 45 - 117 U/L    Protein, total 8.0 6.4 - 8.2 g/dL    Albumin 3.8 3.5 - 5.0 g/dL    Globulin 4.2 (H) 2.0 - 4.0 g/dL    A-G Ratio 0.9 (L) 1.1 - 2.2     LIPASE    Collection Time: 11/17/18  8:12 PM   Result Value Ref Range    Lipase 126 73 - 393 U/L   PTT    Collection Time: 11/17/18  8:12 PM   Result Value Ref Range    aPTT 26.6 22.1 - 32.0 sec    aPTT, therapeutic range     58.0 - 77.0 SECS   PROTHROMBIN TIME + INR    Collection Time: 11/17/18  8:12 PM   Result Value Ref Range    INR 0.9 0.9 - 1.1      Prothrombin time 9.6 9.0 - 11.1 sec   POC TROPONIN-I    Collection Time: 11/17/18  8:12 PM   Result Value Ref Range Troponin-I (POC) <0.04 0.00 - 8.06 ng/mL   METABOLIC PANEL, BASIC    Collection Time: 11/18/18  3:35 AM   Result Value Ref Range    Sodium 139 136 - 145 mmol/L    Potassium 4.0 3.5 - 5.1 mmol/L    Chloride 104 97 - 108 mmol/L    CO2 26 21 - 32 mmol/L    Anion gap 9 5 - 15 mmol/L    Glucose 111 (H) 65 - 100 mg/dL    BUN 14 6 - 20 MG/DL    Creatinine 0.72 0.70 - 1.30 MG/DL    BUN/Creatinine ratio 19 12 - 20      GFR est AA >60 >60 ml/min/1.73m2    GFR est non-AA >60 >60 ml/min/1.73m2    Calcium 8.6 8.5 - 10.1 MG/DL   LIPID PANEL    Collection Time: 11/18/18  3:35 AM   Result Value Ref Range    LIPID PROFILE          Cholesterol, total 219 (H) <200 MG/DL    Triglyceride 75 <150 MG/DL    HDL Cholesterol 57 MG/DL    LDL, calculated 147 (H) 0 - 100 MG/DL    VLDL, calculated 15 MG/DL    CHOL/HDL Ratio 3.8 0 - 5.0     CBC W/O DIFF    Collection Time: 11/18/18  3:35 AM   Result Value Ref Range    WBC 7.4 4.1 - 11.1 K/uL    RBC 4.65 4.10 - 5.70 M/uL    HGB 14.7 12.1 - 17.0 g/dL    HCT 42.5 36.6 - 50.3 %    MCV 91.4 80.0 - 99.0 FL    MCH 31.6 26.0 - 34.0 PG    MCHC 34.6 30.0 - 36.5 g/dL    RDW 12.7 11.5 - 14.5 %    PLATELET 924 252 - 662 K/uL    MPV 9.0 8.9 - 12.9 FL    NRBC 0.0 0  WBC    ABSOLUTE NRBC 0.00 0.00 - 0.01 K/uL   MAGNESIUM    Collection Time: 11/18/18  3:35 AM   Result Value Ref Range    Magnesium 2.1 1.6 - 2.4 mg/dL   PHOSPHORUS    Collection Time: 11/18/18  3:35 AM   Result Value Ref Range    Phosphorus 3.5 2.6 - 4.7 MG/DL   HEPATIC FUNCTION PANEL    Collection Time: 11/18/18  3:35 AM   Result Value Ref Range    Protein, total 6.9 6.4 - 8.2 g/dL    Albumin 3.4 (L) 3.5 - 5.0 g/dL    Globulin 3.5 2.0 - 4.0 g/dL    A-G Ratio 1.0 (L) 1.1 - 2.2      Bilirubin, total 0.4 0.2 - 1.0 MG/DL    Bilirubin, direct 0.1 0.0 - 0.2 MG/DL    Alk.  phosphatase 77 45 - 117 U/L    AST (SGOT) 21 15 - 37 U/L    ALT (SGPT) 26 12 - 78 U/L   HEMOGLOBIN A1C WITH EAG    Collection Time: 11/18/18  3:35 AM   Result Value Ref Range Hemoglobin A1c 5.6 4.2 - 6.3 %    Est. average glucose 114 mg/dL   CK W/ CKMB & INDEX    Collection Time: 11/18/18  3:35 AM   Result Value Ref Range    CK 80 39 - 308 U/L    CK - MB 1.2 <3.6 NG/ML    CK-MB Index 1.5 0 - 2.5     TROPONIN I    Collection Time: 11/18/18  3:35 AM   Result Value Ref Range    Troponin-I, Qt. 0.07 (H) <0.05 ng/mL           CARDIAC DIAGNOSTICS:     Cardiac Evaluation Includes:    a. Cath (11/27/7): LM d20. LAD ost90, p95. RI p70. LCx p40. RCA p30; Right PDA ost30. EF 60% with mild mid/distal anterolat HK. No AVG/MR. Patent L SC.  b. CABG (11/28/7, Rober Hong): LIMA-LAD, SVG-Diag, SVG-RI.  c. Echo (4/14/8): EF 60%. Mild MR/TR. PASP 35.           EKG 4/13/18 - NSR, normal   EKG 11/17/18 - NSR, incomplete RBBB          ASSESSMENT AND PLAN:     Assessment and Plan:  75 yo hx of CAD s/p CABG who presents with new, typical angina      1) Unstable angina; new typical angina  - Will check an exercise cardiolite to risk assess  - Metoprolol started for angina (along with a statin)  - If the stress test shows high risk findings or if he has chest pain on 2 anti-anginal's, then would proceed with a cath. 2) Dyslipidemia  - patient stopped simvastatin several months ago on his own - denies side effects.    - Reviewed risks and benefits and he is agreeable to restart a statin      3) HTN  - BP was high on arrival -- needs good BP control   - metoprolol started - will adjust accordingly           Gordo Bustillos MD, [address], [address]  [address]  [address], [address], [address] [place] [address]  Ph: [phone]   Ph [phone]

## 2018-11-18 NOTE — ED NOTES
Nitro paste removed due to patient with c/o of light headedness and dizziness. BP noted to be 119/78.

## 2018-11-18 NOTE — ED PROVIDER NOTES
76 y.o. male with past medical history significant for CAD, s/p CABG in 2010, and dyslipidemia presents with chief complaint of intermittent chest pain that started approximately 1 week ago. Pt states that his pain started 1 week ago after having sexual intercourse with his wife, noting that it persisted as a \"pressure\" for about 2-3 hours before resolving itself. He explains that the pain has since intermittently resurfaced, describing it as \"discomfort\" that follows \"shortly after doing something. \" Pt reports that he did not have any chest pain yesterday even after exerting himself; he also indicates that it did not return at night. Today, however, the pt notes that he went on a 2 hour jog before going to his car to get home, where he began feeling some intense discomfort, prompting him to come to the ED. Pt states that he took 325 mg ASA about 2 hours ago, reporting that his chest pain has since resolved. Of note, the pt last saw his cardiologist a few years ago. Pt denies any abd pain currently. There are no other acute medical concerns at this time. Social hx: Patient denies Tobacco use. Denies EtOH use. Denies illicit drug abuse. PCP: BRITTANI Fraire Cardiology: Dr. Sundar Walter MD 
 
Note written by Theodor Drafts, as dictated by Ingrid Bliss, DO 8:09 PM 
 
 
 
The history is provided by the patient. No  was used. 7:55 PM 
I have evaluated the patient as the Provider in Triage. I have reviewed His vital signs and the triage nurse assessment. I have talked with the patient and any available family and advised that I am the provider in triage and have ordered the appropriate study to initiate their work up based on the clinical presentation during my assessment. I have advised that the patient will be accommodated in the Main ED as soon as possible.   I have also requested to contact the triage nurse or myself immediately if the patient experiences any changes in their condition during this brief waiting period. Ana Laura Whelan, DO 
 
76 y.o. male with hx of CAD, s/p CABG presents with waxing and waning chest pains that feel similar to his previous MI. Took 325mg ASA PTA, now pain free. Past Medical History:  
Diagnosis Date  CAD (coronary artery disease), native coronary artery 10/28/2010  Dyslipidemia 10/28/2010  Other and unspecified hyperlipidemia 10/28/2010  S/P CABG (status post coronary artery bypass graft) 10/28/2010 No past surgical history on file. Family History:  
Problem Relation Age of Onset  No Known Problems Mother  Hypertension Father Social History Socioeconomic History  Marital status:  Spouse name: Not on file  Number of children: Not on file  Years of education: Not on file  Highest education level: Not on file Social Needs  Financial resource strain: Not on file  Food insecurity - worry: Not on file  Food insecurity - inability: Not on file  Transportation needs - medical: Not on file  Transportation needs - non-medical: Not on file Occupational History  Not on file Tobacco Use  Smoking status: Never Smoker  Smokeless tobacco: Never Used Substance and Sexual Activity  Alcohol use: No  
 Drug use: Not on file  Sexual activity: Not on file Other Topics Concern  Not on file Social History Narrative  Not on file ALLERGIES: Patient has no known allergies. Review of Systems Constitutional: Negative for appetite change, chills, fever and unexpected weight change. HENT: Negative for ear pain, hearing loss, rhinorrhea and trouble swallowing. Eyes: Negative for pain and visual disturbance. Respiratory: Negative for cough, chest tightness and shortness of breath. Cardiovascular: Positive for chest pain (resolved). Negative for palpitations. Gastrointestinal: Negative for abdominal distention, abdominal pain, blood in stool and vomiting. Genitourinary: Negative for dysuria, hematuria and urgency. Musculoskeletal: Negative for back pain and myalgias. Skin: Negative for rash. Neurological: Negative for dizziness, syncope, weakness and numbness. Psychiatric/Behavioral: Negative for confusion and suicidal ideas. All other systems reviewed and are negative. Vitals:  
 11/17/18 2215 11/17/18 2230 11/17/18 2315 11/17/18 2330 BP: 143/89 (!) 140/93 (!) 139/92 (!) 146/95 Pulse: 70 70 69 68 Resp: 10 14 16 13 Temp:      
SpO2: 95%  94% 93% Weight:      
Height:      
      
 
Physical Exam  
Constitutional: He is oriented to person, place, and time. He appears well-developed and well-nourished. No distress. HENT:  
Head: Normocephalic and atraumatic. Right Ear: External ear normal.  
Left Ear: External ear normal.  
Nose: Nose normal.  
Mouth/Throat: Oropharynx is clear and moist. No oropharyngeal exudate. Eyes: Conjunctivae and EOM are normal. Pupils are equal, round, and reactive to light. Right eye exhibits no discharge. Left eye exhibits no discharge. No scleral icterus. Neck: Normal range of motion. Neck supple. No JVD present. No tracheal deviation present. Cardiovascular: Normal rate, regular rhythm, normal heart sounds and intact distal pulses. Exam reveals no gallop and no friction rub. No murmur heard. Pulmonary/Chest: Effort normal and breath sounds normal. No stridor. No respiratory distress. He has no decreased breath sounds. He has no wheezes. He has no rhonchi. He has no rales. He exhibits no tenderness. Abdominal: Soft. Bowel sounds are normal. He exhibits no distension. There is no tenderness. There is no rebound and no guarding. Musculoskeletal: Normal range of motion. He exhibits no edema or tenderness. Neurological: He is alert and oriented to person, place, and time.  He has normal strength and normal reflexes. He displays normal reflexes. No cranial nerve deficit or sensory deficit. He exhibits normal muscle tone. Coordination normal. GCS eye subscore is 4. GCS verbal subscore is 5. GCS motor subscore is 6. Skin: Skin is warm and dry. No rash noted. He is not diaphoretic. No erythema. No pallor. Psychiatric: He has a normal mood and affect. His behavior is normal. Judgment and thought content normal.  
Nursing note and vitals reviewed. Note written by Kaylee Browne DO, Scribe, as dictated by Michelle Ramirez DO 8:16 PM 
 
MDM Procedures Chief Complaint Patient presents with  Chest Pain The patient's presenting problems have been discussed, and they are in agreement with the care plan formulated and outlined with them. I have encouraged them to ask questions as they arise throughout their visit. MEDICATIONS GIVEN: 
Medications  
labetalol (NORMODYNE;TRANDATE) injection 20 mg (20 mg IntraVENous Given 11/17/18 2133)  
nitroglycerin (NITROBID) 2 % ointment 0.5 Inch (not administered)  
metoprolol tartrate (LOPRESSOR) tablet 25 mg (25 mg Oral Given 11/17/18 2133)  
sodium chloride (NS) flush 5-10 mL (not administered)  
sodium chloride (NS) flush 5-10 mL (not administered)  
acetaminophen (TYLENOL) tablet 650 mg (not administered) HYDROcodone-acetaminophen (NORCO) 5-325 mg per tablet 1 Tab (not administered)  
naloxone (NARCAN) injection 0.4 mg (not administered) diphenhydrAMINE (BENADRYL) injection 12.5 mg (not administered)  
ondansetron (ZOFRAN) injection 4 mg (not administered) docusate sodium (COLACE) capsule 100 mg (not administered) morphine injection 2 mg (not administered)  
enoxaparin (LOVENOX) injection 40 mg (not administered) aspirin tablet 325 mg (not administered)  
nitroglycerin (NITROBID) 2 % ointment 1 Inch (1 Inch Topical Given 11/17/18 2043) LABS REVIEWED: 
Recent Results (from the past 24 hour(s)) CBC WITH AUTOMATED DIFF Collection Time: 11/17/18  8:12 PM  
Result Value Ref Range WBC 8.1 4.1 - 11.1 K/uL  
 RBC 5.06 4.10 - 5.70 M/uL  
 HGB 16.0 12.1 - 17.0 g/dL HCT 46.7 36.6 - 50.3 % MCV 92.3 80.0 - 99.0 FL  
 MCH 31.6 26.0 - 34.0 PG  
 MCHC 34.3 30.0 - 36.5 g/dL  
 RDW 12.6 11.5 - 14.5 % PLATELET 664 187 - 554 K/uL MPV 8.9 8.9 - 12.9 FL  
 NRBC 0.0 0  WBC ABSOLUTE NRBC 0.00 0.00 - 0.01 K/uL NEUTROPHILS 65 32 - 75 % LYMPHOCYTES 26 12 - 49 % MONOCYTES 6 5 - 13 % EOSINOPHILS 1 0 - 7 % BASOPHILS 1 0 - 1 % IMMATURE GRANULOCYTES 0 0.0 - 0.5 % ABS. NEUTROPHILS 5.3 1.8 - 8.0 K/UL  
 ABS. LYMPHOCYTES 2.2 0.8 - 3.5 K/UL  
 ABS. MONOCYTES 0.5 0.0 - 1.0 K/UL  
 ABS. EOSINOPHILS 0.1 0.0 - 0.4 K/UL  
 ABS. BASOPHILS 0.1 0.0 - 0.1 K/UL  
 ABS. IMM. GRANS. 0.0 0.00 - 0.04 K/UL  
 DF AUTOMATED METABOLIC PANEL, COMPREHENSIVE Collection Time: 11/17/18  8:12 PM  
Result Value Ref Range Sodium 139 136 - 145 mmol/L Potassium 3.7 3.5 - 5.1 mmol/L Chloride 102 97 - 108 mmol/L  
 CO2 27 21 - 32 mmol/L Anion gap 10 5 - 15 mmol/L Glucose 155 (H) 65 - 100 mg/dL BUN 17 6 - 20 MG/DL Creatinine 0.87 0.70 - 1.30 MG/DL  
 BUN/Creatinine ratio 20 12 - 20 GFR est AA >60 >60 ml/min/1.73m2 GFR est non-AA >60 >60 ml/min/1.73m2 Calcium 8.9 8.5 - 10.1 MG/DL Bilirubin, total 0.3 0.2 - 1.0 MG/DL  
 ALT (SGPT) 31 12 - 78 U/L  
 AST (SGOT) 22 15 - 37 U/L Alk. phosphatase 93 45 - 117 U/L Protein, total 8.0 6.4 - 8.2 g/dL Albumin 3.8 3.5 - 5.0 g/dL Globulin 4.2 (H) 2.0 - 4.0 g/dL A-G Ratio 0.9 (L) 1.1 - 2.2 LIPASE Collection Time: 11/17/18  8:12 PM  
Result Value Ref Range Lipase 126 73 - 393 U/L  
PTT Collection Time: 11/17/18  8:12 PM  
Result Value Ref Range aPTT 26.6 22.1 - 32.0 sec  
 aPTT, therapeutic range     58.0 - 77.0 SECS  
PROTHROMBIN TIME + INR Collection Time: 11/17/18  8:12 PM  
Result Value Ref Range INR 0.9 0.9 - 1.1 Prothrombin time 9.6 9.0 - 11.1 sec POC TROPONIN-I Collection Time: 11/17/18  8:12 PM  
Result Value Ref Range Troponin-I (POC) <0.04 0.00 - 0.08 ng/mL VITAL SIGNS: 
Patient Vitals for the past 12 hrs: 
 Temp Pulse Resp BP SpO2  
11/17/18 2330  68 13 (!) 146/95 93 % 11/17/18 2315  69 16 (!) 139/92 94 % 11/17/18 2230  70 14 (!) 140/93   
11/17/18 2215  70 10 143/89 95 % 11/17/18 2200  72 17 119/78 93 % 11/17/18 2145  73 16 128/84 93 % 11/17/18 2130  85 13 (!) 187/107   
11/17/18 2100  86 15 (!) 201/103 92 % 11/17/18 2045  85 20 (!) 173/95 95 % 11/17/18 2022  81 11  93 % 11/17/18 2010  95 18 (!) 187/95 94 % 11/17/18 2004 98 °F (36.7 °C) 87 14 (!) 217/117 96 % RADIOLOGY RESULTS: 
The following have been ordered and reviewed: 
Xr Chest Pa Lat Result Date: 11/17/2018 Exam:  2 view chest Indication: Chest pain today PA and lateral views demonstrate normal heart size. Patient is status post CABG procedure. There is no acute process in the lung fields. Degenerative changes are seen in the thoracic spine. Impression: No acute process. ED EKG interpretation: 
Rhythm: normal sinus rhythm and incomplete RBBB; and regular . Rate (approx.): 90; Axis: normal; P wave: normal; QRS interval: normal ; ST/T wave: normal; Other findings: abnormal ekg. This EKG was interpreted by J Carlos Gomez DO, ED Provider. CONSULTATIONS:  
CONSULT NOTE: 
8:57 PM Vickie Preston DO spoke with Dr. Vesta Price, Consult for Hospitalist.  Discussed available diagnostic tests and clinical findings. Dr. Vesta Price will evaluate the pt for admission. PROGRESS NOTES: 
8:57 PM 
Patient is being admitted to the hospital.  The results of their tests and reasons for their admission have been discussed with them and/or available family. They convey agreement and understanding for the need to be admitted and for their admission diagnosis.   Consultation will be made now with the inpatient physician for hospitalization. Discussed results and plan with patient. Patient will be admitted/observed for further evaluation and treatment. DIAGNOSIS: 
 
1. Chest pain, unspecified type 2. Coronary artery disease involving native heart with angina pectoris, unspecified vessel or lesion type ([address] 3. Hypertension, unspecified type PLAN: 
Admit/obs ED COURSE: The patient's hospital course has been uncomplicated.

## 2018-11-18 NOTE — ED TRIAGE NOTES
Patient arrives with complaints of chest pain that presented about and hour or two ago. Patient has cardiac history and took 325 asa prior to arrival. Denies chest pain at time of triage. Roomed to obtain EKG and complete triage

## 2018-11-18 NOTE — PROGRESS NOTES
[facility]: MED RECONCILIATION Comments/Recommendations:  PTA medications were reviewed with patient. He was well aware of medications, doses, and when he last took them. Medications added: · Fish oil 1000mg PO daily · Garlic - 1 cap PO daily Medications removed: · Simvastatin 20mg PO QHS - reports stopping 5-6 months ago Medications adjusted: 
 
· NONE Allergies: Patient has no known allergies. Prior to Admission Medications:  
Prior to Admission Medications Prescriptions Last Dose Informant Patient Reported? Taking? OTHER 11/10/2018 at Unknown time  Yes Yes Sig: Mixture of lemon,garlic, vinegar and christie - takes daily Omega-3 Fatty Acids (FISH OIL) 500 mg cap 11/10/2018 at Unknown time  Yes Yes Sig: Take 1,000 mg by mouth daily. ascorbic acid (VITAMIN C) 500 mg tablet 11/17/2018 at am  Yes Yes Sig: Take 1,000 mg by mouth daily. aspirin (ASPIRIN) 325 mg tablet 11/17/2018 at 1800  Yes Yes Sig: Take 325 mg by mouth daily. garlic cap 04/90/5177 at Unknown time  Yes Yes Sig: Take 1 Cap by mouth daily. magnesium oxide (MAG-OX) 400 mg tablet 11/15/2018 at am  Yes Yes Sig: Take 400 mg by mouth daily. multivitamin (ONE A DAY) tablet 11/17/2018 at am  Yes Yes Sig: Take 1 Tab by mouth daily. Facility-Administered Medications: None Kenny Stark, Pharm. D. [address] [place] [address] Dr AGRAW[facility]

## 2018-11-18 NOTE — ROUTINE PROCESS
TRANSFER - OUT REPORT: 
 
Verbal report given to [employer] [place], RN on Shayla Santillan  being transferred to [facility] for routine progression of care Report consisted of patients Situation, Background, Assessment and  
Recommendations(SBAR). Information from the following report(s) SBAR, Kardex, ED Summary, Intake/Output, MAR, Recent Results, Med Rec Status and Cardiac Rhythm Monitor was reviewed with the receiving nurse. Lines:  
Peripheral IV 11/17/18 Left Antecubital (Active) Site Assessment Clean, dry, & intact 11/17/2018  8:14 PM  
Phlebitis Assessment 0 11/17/2018  8:14 PM  
Infiltration Assessment 0 11/17/2018  8:14 PM  
Dressing Status Clean, dry, & intact 11/17/2018  8:14 PM  
Dressing Type Transparent;Tape 11/17/2018  8:14 PM  
Hub Color/Line Status Pink;Patent; Flushed;Capped 11/17/2018  8:14 PM  
  
 
Opportunity for questions and clarification was provided. Patient transported with: 
 Registered Nurse

## 2018-11-18 NOTE — H&P
[address], [address] [place] [address] 
[phone] Admission History and Physical 
 
 
NAME:  Olimpia Galvan :   1950 MRN:  [id number] PCP:  BRITTANI Mejia Date/Time:  2018 Subjective: CHIEF COMPLAINT: chest pain HISTORY OF PRESENT ILLNESS:    
The patient is a 77 yo hx of HTN, CAD s/p CABG, presented w/ chest pain concerning for angina. The patient had a CABG 12 years ago and has been doing well. He stopped taking metoprolol and simvastatin several months ago. Over the past week, the patient started to have midsternal chest pain with exertion. The pain is pressure, 5/10, intermittent, with some dyspnea. Denied orthopnea, PND, diaphoresis. In the ED, initial EKG and trop neg. No Known Allergies Prior to Admission medications Medication Sig Start Date End Date Taking? Authorizing Provider OTHER Indications: concoction of lemon,garlic, vinegar and christie    Provider, Historical  
magnesium oxide (MAG-OX) 400 mg tablet Take 400 mg by mouth daily. Provider, Historical  
multivitamin (ONE A DAY) tablet Take 1 Tab by mouth daily. Provider, Historical  
simvastatin (ZOCOR) 20 mg tablet Take 1 Tab by mouth nightly. 17   Gaby Chu MD  
ascorbic acid (VITAMIN C) 500 mg tablet Take 1,000 mg by mouth. Provider, Historical  
aspirin (ASPIRIN) 325 mg tablet Take 325 mg by mouth daily. Provider, Historical  
 
 
Past Medical History:  
Diagnosis Date  CAD (coronary artery disease), native coronary artery 10/28/2010  Dyslipidemia 10/28/2010  Other and unspecified hyperlipidemia 10/28/2010  S/P CABG (status post coronary artery bypass graft) 10/28/2010 No past surgical history on file. Social History Tobacco Use  Smoking status: Never Smoker  Smokeless tobacco: Never Used Substance Use Topics  Alcohol use: No  
  
 
Family History Problem Relation Age of Onset  No Known Problems Mother  Hypertension Father Review of Systems: 
(bold if positive, if negative) Gen:  Eyes:  ENT:  CVS:   chest painPulm:  GI:   
:   
MS:  Skin:  Psych:  Endo:   
Hem:  Renal:   
Neuro:    
 
  
Objective: VITALS:   
Vital signs reviewed; most recent are: 
 
Visit Vitals BP (!) 173/95 Pulse 85 Temp 98 °F (36.7 °C) Resp 20 Ht 5' 9\" (1.753 m) Wt 83.9 kg (185 lb) SpO2 95% BMI 27.32 kg/m² SpO2 Readings from Last 6 Encounters:  
11/17/18 95% 04/14/17 98% 04/07/17 98% No intake or output data in the 24 hours ending 11/17/18 2121 Exam:  
 
Physical Exam: 
 
Gen:  Well-developed, well-nourished, in no acute distress HEENT:  Pink conjunctivae, PERRL, hearing intact to voice, moist mucous membranes Neck:  Supple, without masses, thyroid non-tender Resp:  No accessory muscle use, clear breath sounds without wheezes rales or rhonchi 
Card:  No murmurs, normal S1, S2 without thrills, bruits or peripheral edema Abd:  Soft, non-tender, non-distended, normoactive bowel sounds are present Lymph:  No cervical adenopathy Musc:  No cyanosis or clubbing Skin:  No rashes or ulcers, skin turgor is good Neuro:  Cranial nerves 3-12 are grossly intact, follows commands appropriately Psych:  Alert with good insight. Oriented to person, place, and time Labs: 
 
Recent Labs 11/17/18 2012 WBC 8.1 HGB 16.0 HCT 46.7  Recent Labs 11/17/18 2012   
K 3.7  CO2 27 * BUN 17 CREA 0.87 CA 8.9 ALB 3.8 TBILI 0.3 SGOT 22 ALT 31 No results found for: Roxana Cárdenas No results for input(s): PH, PCO2, PO2, HCO3, FIO2 in the last 72 hours. Recent Labs 11/17/18 2012 INR 0.9 Radiology and EKG reviewed:   pending **[facility] reviewed in [facility]** Assessment/Plan:   
  
Principal Problem: 75 yo hx of HTN, CAD s/p CABG, presented w/ chest pain concerning for angina 1) Angina at rest/CAD: has CABG 12 years ago, but stopped taking statin, BB. Angina with exertion for past week. Initial EKG and enzymes neg. Will monitor on Tele. Check serial enzymes, lipids, echo. Start O2, IV morphine prn, ASA, nitro. Restart metoprolol. Consult Cards for cath vs stress 2) Dyslipidemia: patient stopped simvastatin several months ago. Will recheck lipids. Will need to get back on statin 3) Uncontrolled HTN: will restart metoprolol. Use IV labetalol prn. Will likely need a secondary agent Code: Full Risk of deterioration: high Total time spent with patient: 70 Minutes Care Plan discussed with: Patient, family, nursing Discussed:  Care Plan Prophylaxis:  Lovenox Probable Disposition:  Home w/Family 
        
___________________________________________________ Attending Physician: Zahra Motta MD

## 2018-11-18 NOTE — PROGRESS NOTES
physical Therapy EVALUATION/DISCHARGE Patient: Moo Hart (30 y.o. male) Date: 11/18/2018 Primary Diagnosis: Angina at rest [facility]) Chest pain Precautions:     
ASSESSMENT : 
Based on the objective data described below, the patient presents with functional mobility at baseline level with good strength, balance, functional activity tolerance and steady gait following admission for chest pain. Patient reports resolution of chest pain and troponins down trending, to have cardiac cath tomorrow per RN. PTA patient was independent with all functional mobility and very active, exercises and works still on contract basis. Patient lives with his wife in a one level home, no DME. Currently, patient is independent with all aspects of functional mobility. Patient ambulated 300' with no safety concerns, normal speed and stable VS.  Patient left supine at the conclusion of PT evaluation. Patient presents at baseline level of function at this time and skilled PT services are not indicated at this time. Will complete order. Skilled physical therapy is not indicated at this time. PLAN : 
Discharge Recommendations: None Further Equipment Recommendations for Discharge: none SUBJECTIVE:  
Patient stated I'm feeling much better but this is how it started last time (hx of CABG.  
 
OBJECTIVE DATA SUMMARY:  
HISTORY:   
Past Medical History:  
Diagnosis Date  CAD (coronary artery disease), native coronary artery 10/28/2010  Dyslipidemia 10/28/2010  Other and unspecified hyperlipidemia 10/28/2010  S/P CABG (status post coronary artery bypass graft) 10/28/2010 No past surgical history on file. Prior Level of Function/Home Situation: indep with all mobility without AD Personal factors and/or comorbidities impacting plan of care:  
 
Home Situation Home Environment: Private residence # Steps to Enter: 4 One/Two Story Residence: One story Living Alone: No 
 Support Systems: Spouse/Significant Other/Partner, Family member(s) Patient Expects to be Discharged to[de-identified] Private residence Current DME Used/Available at Home: None EXAMINATION/PRESENTATION/DECISION MAKING:  
Critical Behavior: 
Neurologic State: Alert Orientation Level: Oriented X4 Cognition: Appropriate decision making, Appropriate safety awareness Hearing: 
 Skin:  normal 
Edema: none Range Of Motion: 
AROM: Within functional limits PROM: Within functional limits Strength:   
Strength: Within functional limits Tone & Sensation:  
Tone: Normal 
  
  
  
  
Sensation: Intact Coordination: 
Coordination: Within functional limits Vision:  
  
Functional Mobility: 
Bed Mobility: 
Rolling: Independent Supine to Sit: Independent Sit to Supine: Independent Scooting: Independent Transfers: 
Sit to Stand: Independent Stand to Sit: Independent Stand Pivot Transfers: Independent Balance:  
Sitting: Intact; Without support Standing: Intact; Without support Ambulation/Gait Training: 
Distance (ft): 300 Feet (ft) Assistive Device: Gait belt Ambulation - Level of Assistance: Independent Functional Measure: 
Timed up and go: 
 
Timed Get Up And Go Test: 11 Timed Up and Go and G-code impairment scale: 
Percentage of Impairment CH 
 
0% 
 CI 
 
1-19% CJ 
 
20-39% CK 
 
40-59% CL 
 
60-79% CM 
 
80-99% CN  
 
100% Timed Score 0-56 10 11-12 13-14 15-16 17-18 19 20  
 
 
< than 10 seconds=Normal  Greater then 13.5 seconds (in elderly)=Increased fall risk Belkis SHEIKH. Predicting the probability for falls in community dwelling older adults using the Timed Up and Go Test. Phys Ther. 2000;80:896-903. G codes: In compliance with CMSs Claims Based Outcome Reporting, the following G-code set was chosen for this patient based on their primary functional limitation being treated: The outcome measure chosen to determine the severity of the functional limitation was the TUG with a score of 11* which was correlated with the impairment scale. ? Mobility - Walking and Moving Around:  
  - CURRENT STATUS: CI - 1%-19% impaired, limited or restricted  - GOAL STATUS: CI - 1%-19% impaired, limited or restricted  - D/C STATUS:  CI - 1%-19% impaired, limited or restricted Physical Therapy Evaluation Charge Determination History Examination Presentation Decision-Making MEDIUM  Complexity : 1-2 comorbidities / personal factors will impact the outcome/ POC  MEDIUM Complexity : 3 Standardized tests and measures addressing body structure, function, activity limitation and / or participation in recreation  MEDIUM Complexity : Evolving with changing characteristics  Other outcome measures TUG 11  LOW Based on the above components, the patient evaluation is determined to be of the following complexity level: LOW Pain: 
  
  
  
Activity Tolerance:  
Good - 300' with normal speed and no c/o chest pain, VSS Please refer to the flowsheet for vital signs taken during this treatment. After treatment:  
[]   Patient left in no apparent distress sitting up in chair 
[x]   Patient left in no apparent distress in bed 
[x]   Call bell left within reach [x]   Nursing notified 
[]   Caregiver present 
[]   Bed alarm activated COMMUNICATION/EDUCATION:  
Communication/Collaboration: 
[x]   Fall prevention education was provided and the patient/caregiver indicated understanding. [x]   Patient/family have participated as able and agree with findings and recommendations. []   Patient is unable to participate in plan of care at this time. Findings and recommendations were discussed with: Registered Nurse Thank you for this referral. 
Angela Deutsch, PT Time Calculation: 16 mins

## 2018-11-19 ENCOUNTER — APPOINTMENT (OUTPATIENT)
Dept: NUCLEAR MEDICINE | Age: 68
End: 2018-11-19
Attending: SPECIALIST
Payer: MEDICARE

## 2018-11-19 VITALS
BODY MASS INDEX: 26.78 KG/M2 | RESPIRATION RATE: 16 BRPM | TEMPERATURE: 97.6 F | HEART RATE: 75 BPM | DIASTOLIC BLOOD PRESSURE: 68 MMHG | SYSTOLIC BLOOD PRESSURE: 121 MMHG | WEIGHT: 180.78 LBS | OXYGEN SATURATION: 94 % | HEIGHT: 69 IN

## 2018-11-19 LAB
ANION GAP SERPL CALC-SCNC: 9 MMOL/L (ref 5–15)
ATTENDING PHYSICIAN, CST07: NORMAL
BASOPHILS # BLD: 0.1 K/UL (ref 0–0.1)
BASOPHILS NFR BLD: 1 % (ref 0–1)
BUN SERPL-MCNC: 14 MG/DL (ref 6–20)
BUN/CREAT SERPL: 16 (ref 12–20)
CALCIUM SERPL-MCNC: 8.9 MG/DL (ref 8.5–10.1)
CHLORIDE SERPL-SCNC: 103 MMOL/L (ref 97–108)
CO2 SERPL-SCNC: 25 MMOL/L (ref 21–32)
CREAT SERPL-MCNC: 0.89 MG/DL (ref 0.7–1.3)
DIAGNOSIS, 93000: NORMAL
DIFFERENTIAL METHOD BLD: ABNORMAL
DUKE TM SCORE RESULT, CST14: NORMAL
DUKE TREADMILL SCORE, CST13: NORMAL
ECG INTERP BEFORE EX, CST11: NORMAL
ECG INTERP DURING EX, CST12: NORMAL
EOSINOPHIL # BLD: 0.2 K/UL (ref 0–0.4)
EOSINOPHIL NFR BLD: 2 % (ref 0–7)
ERYTHROCYTE [DISTWIDTH] IN BLOOD BY AUTOMATED COUNT: 12.7 % (ref 11.5–14.5)
FUNCTIONAL CAPACITY, CST17: NORMAL
GLUCOSE SERPL-MCNC: 104 MG/DL (ref 65–100)
HCT VFR BLD AUTO: 42.3 % (ref 36.6–50.3)
HGB BLD-MCNC: 14.6 G/DL (ref 12.1–17)
IMM GRANULOCYTES # BLD: 0 K/UL (ref 0–0.04)
IMM GRANULOCYTES NFR BLD AUTO: 1 % (ref 0–0.5)
KNOWN CARDIAC CONDITION, CST08: NORMAL
LYMPHOCYTES # BLD: 2.4 K/UL (ref 0.8–3.5)
LYMPHOCYTES NFR BLD: 33 % (ref 12–49)
MAGNESIUM SERPL-MCNC: 2.1 MG/DL (ref 1.6–2.4)
MAX. DIASTOLIC BP, CST04: 75 MMHG
MAX. HEART RATE, CST05: 91 BPM
MAX. SYSTOLIC BP, CST03: 125 MMHG
MCH RBC QN AUTO: 31.7 PG (ref 26–34)
MCHC RBC AUTO-ENTMCNC: 34.5 G/DL (ref 30–36.5)
MCV RBC AUTO: 91.8 FL (ref 80–99)
MONOCYTES # BLD: 0.8 K/UL (ref 0–1)
MONOCYTES NFR BLD: 10 % (ref 5–13)
NEUTS SEG # BLD: 3.9 K/UL (ref 1.8–8)
NEUTS SEG NFR BLD: 53 % (ref 32–75)
NRBC # BLD: 0 K/UL (ref 0–0.01)
NRBC BLD-RTO: 0 PER 100 WBC
OVERALL BP RESPONSE TO EXERCISE, CST16: NORMAL
OVERALL HR RESPONSE TO EXERCISE, CST15: NORMAL
PEAK EX METS, CST10: 1.5 METS
PHOSPHATE SERPL-MCNC: 3.5 MG/DL (ref 2.6–4.7)
PLATELET # BLD AUTO: 228 K/UL (ref 150–400)
PMV BLD AUTO: 9.1 FL (ref 8.9–12.9)
POTASSIUM SERPL-SCNC: 4 MMOL/L (ref 3.5–5.1)
PROTOCOL NAME, CST01: NORMAL
RBC # BLD AUTO: 4.61 M/UL (ref 4.1–5.7)
REASON FOR TERMINATION: 77 BPM
SODIUM SERPL-SCNC: 137 MMOL/L (ref 136–145)
TEST INDICATION, CST09: NORMAL
TIME IN EXERCISE PHASE, CST02: NORMAL
WBC # BLD AUTO: 7.3 K/UL (ref 4.1–11.1)

## 2018-11-19 PROCEDURE — 83735 ASSAY OF MAGNESIUM: CPT

## 2018-11-19 PROCEDURE — 80048 BASIC METABOLIC PNL TOTAL CA: CPT

## 2018-11-19 PROCEDURE — 93017 CV STRESS TEST TRACING ONLY: CPT

## 2018-11-19 PROCEDURE — 74011250637 HC RX REV CODE- 250/637: Performed by: INTERNAL MEDICINE

## 2018-11-19 PROCEDURE — 96372 THER/PROPH/DIAG INJ SC/IM: CPT

## 2018-11-19 PROCEDURE — 84100 ASSAY OF PHOSPHORUS: CPT

## 2018-11-19 PROCEDURE — 74011250636 HC RX REV CODE- 250/636: Performed by: INTERNAL MEDICINE

## 2018-11-19 PROCEDURE — 85025 COMPLETE CBC W/AUTO DIFF WBC: CPT

## 2018-11-19 PROCEDURE — C8929 TTE W OR WO FOL WCON,DOPPLER: HCPCS

## 2018-11-19 PROCEDURE — 74011250636 HC RX REV CODE- 250/636: Performed by: SPECIALIST

## 2018-11-19 PROCEDURE — 36415 COLL VENOUS BLD VENIPUNCTURE: CPT

## 2018-11-19 PROCEDURE — 94760 N-INVAS EAR/PLS OXIMETRY 1: CPT

## 2018-11-19 PROCEDURE — A9500 TC99M SESTAMIBI: HCPCS

## 2018-11-19 PROCEDURE — 99218 HC RM OBSERVATION: CPT

## 2018-11-19 RX ORDER — METOPROLOL TARTRATE 50 MG/1
50 TABLET ORAL EVERY 12 HOURS
Qty: 60 TAB | Refills: 0 | Status: SHIPPED | OUTPATIENT
Start: 2018-11-19 | End: 2018-12-13 | Stop reason: SDUPTHER

## 2018-11-19 RX ORDER — ATORVASTATIN CALCIUM 40 MG/1
40 TABLET, FILM COATED ORAL DAILY
Qty: 30 TAB | Refills: 0 | Status: SHIPPED | OUTPATIENT
Start: 2018-11-20 | End: 2018-12-13 | Stop reason: SDUPTHER

## 2018-11-19 RX ORDER — METOPROLOL TARTRATE 50 MG/1
50 TABLET ORAL EVERY 12 HOURS
Status: DISCONTINUED | OUTPATIENT
Start: 2018-11-19 | End: 2018-11-19 | Stop reason: HOSPADM

## 2018-11-19 RX ADMIN — DOCUSATE SODIUM 100 MG: 100 CAPSULE, LIQUID FILLED ORAL at 09:00

## 2018-11-19 RX ADMIN — Medication 10 ML: at 13:35

## 2018-11-19 RX ADMIN — ATORVASTATIN CALCIUM 40 MG: 20 TABLET, FILM COATED ORAL at 09:00

## 2018-11-19 RX ADMIN — REGADENOSON 0.4 MG: 0.08 INJECTION, SOLUTION INTRAVENOUS at 11:58

## 2018-11-19 RX ADMIN — PERFLUTREN 2 ML: 6.52 INJECTION, SUSPENSION INTRAVENOUS at 11:07

## 2018-11-19 RX ADMIN — METOPROLOL TARTRATE 25 MG: 25 TABLET ORAL at 09:01

## 2018-11-19 RX ADMIN — Medication 10 ML: at 06:22

## 2018-11-19 RX ADMIN — ASPIRIN 325 MG: 325 TABLET, COATED ORAL at 09:00

## 2018-11-19 RX ADMIN — ENOXAPARIN SODIUM 40 MG: 40 INJECTION SUBCUTANEOUS at 09:01

## 2018-11-19 NOTE — PROGRESS NOTES
Cardiology Progress Note [address] [facility]. [address], [place], [address] [facility] Phone [phone]; Fax [phone] 
 
 
 
2018 9:18 AM  
 
Admit Date:           2018 Admit Diagnosis:  Angina at rest [facility]) Chest pain :          1950 MRN:          [id number] ASSESSMENT/RECOMMENDATION:  
1) Unstable angina; new typical angina - Will check an exercise cardiolite to risk assess - Metoprolol started for angina (along with a statin & ASA) - If low risk, then will medically manage. If the stress test shows high risk findings or if he has chest pain on 2 anti-anginal's, then would proceed with a cath.    
  
2) Dyslipidemia - patient stopped simvastatin several months ago on his own - denies side effects.   
- Reviewed risks and benefits and he is agreeable to restart a statin  
 -LDL elevated at 147 
 
3) HTN: BP looked good yesterday, a little elevated this AM, will reassess after AM medications. - metoprolol started - will adjust accordingly CARDIOLOGY ATTENDING Patient personally seen and examined. All the elements of history and examination were personally performed. Assessment and plan was discussed and agree as written above He remained pain free overnight. Since BP still a little high, have increased metoprolol to 50 mg starting this evening. Will see what the nuclear stress test shows to further guide management (as above). ADDENDUM:   
Nuclear stress test 18 - Patient walked 8:42 min on treadmill (10 METS, no CP or ischemic EKG changes) but HR blunted due to BB so was switched to [facility] study. MPI shows normal perfusion. LVEF 58%. Jacquelyn Goltz, MD, [facility] No intake/output data recorded. Last 3 Recorded Weights in this Encounter 18 9769 Weight: 185 lb (83.9 kg) 180 lb 12.4 oz (82 kg) No intake/output data recorded. SUBJECTIVE Jeff Godinez denies palpitations, irregular heart beat, SOB, chest pain or LE edema. No lightheadedness or dizziness Current Facility-Administered Medications Medication Dose Route Frequency  atorvastatin (LIPITOR) tablet 40 mg  40 mg Oral DAILY  labetalol (NORMODYNE;TRANDATE) injection 20 mg  20 mg IntraVENous Q4H PRN  
 nitroglycerin (NITROBID) 2 % ointment 0.5 Inch  0.5 Inch Topical BID PRN  
 metoprolol tartrate (LOPRESSOR) tablet 25 mg  25 mg Oral Q12H  
 sodium chloride (NS) flush 5-10 mL  5-10 mL IntraVENous Q8H  
 sodium chloride (NS) flush 5-10 mL  5-10 mL IntraVENous PRN  
 acetaminophen (TYLENOL) tablet 650 mg  650 mg Oral Q4H PRN  
 HYDROcodone-acetaminophen (NORCO) 5-325 mg per tablet 1 Tab  1 Tab Oral Q4H PRN  
 naloxone (NARCAN) injection 0.4 mg  0.4 mg IntraVENous PRN  
 diphenhydrAMINE (BENADRYL) injection 12.5 mg  12.5 mg IntraVENous Q4H PRN  
 ondansetron (ZOFRAN) injection 4 mg  4 mg IntraVENous Q6H PRN  
 docusate sodium (COLACE) capsule 100 mg  100 mg Oral BID  morphine injection 2 mg  2 mg IntraVENous Q4H PRN  
 enoxaparin (LOVENOX) injection 40 mg  40 mg SubCUTAneous Q24H  
 aspirin tablet 325 mg  325 mg Oral DAILY OBJECTIVE No intake or output data in the 24 hours ending 11/19/18 0918 Review of Systems - History obtained from the patient AS PER  [facility] Telemetry nSR PHYSICAL EXAM  
  
 
Visit Vitals /85 (BP 1 Location: Left arm, BP Patient Position: Sitting) Pulse 69 Temp 97.6 °F (36.4 °C) Resp 18 Ht 5' 9\" (1.753 m) Wt 180 lb 12.4 oz (82 kg) SpO2 92% BMI 26.70 kg/m² Gen: Well-developed, thin, in no acute distress  alert and oriented x 3 HEENT:  Pink conjunctivae, Hearing grossly normal.No scleral icterus or conjunctival, moist mucous membranes Neck: Supple,No JVD Resp: No accessory muscle use, Clear breath sounds, No rales or rhonchi 
Card: Regular Rate,Rythm,, No murmurs, rubs or gallop. No thrills. GI:          soft, non-tender MSK: No cyanosis or clubbing, good capillary refill Skin: No rashes or ulcers, no bruising Neuro:  Cranial nerves are grossly intact, moving all four extremities, no focal deficit, follows commands appropriately Psych:  Good insight, oriented to person, place and time, alert, Nml Affect LE: No edema DATA REVIEW Laboratory and Imaging have been reviewed by me and are notable for Recent Labs 11/18/18 
1602 11/18/18 
0335 CPK  --  80  
CKMB  --  1.2  
TROIQ 0.07* 0.07* Recent Labs 11/19/18 
0223 11/18/18 
0335 11/17/18 2012  139 139  
K 4.0 4.0 3.7 CO2 25 26 27 BUN 14 14 17 CREA 0.89 0.72 0.87 * 111* 155* PHOS 3.5 3.5  --   
MG 2.1 2.1  --   
WBC 7.3 7.4 8.1 HGB 14.6 14.7 16.0 HCT 42.3 42.5 46.7  214 233 Ivis Thomas NP

## 2018-11-19 NOTE — PROGRESS NOTES
No evidence of ischemia on nuclear stress test EF 58% Okay to d./c from cardiac standpoint Reviewed stress test results with the patient and wife Reviewed new medications Future Appointments Date Time Provider Edi Doss 12/13/2018  4:00 PM Dony Henriquez MD [address] [place] [address]

## 2018-11-19 NOTE — PROGRESS NOTES
Bedside and Verbal shift change report given to Ximena (oncoming nurse) by Princess Mathur (offgoing nurse). Report included the following information SBAR, Kardex and Cardiac Rhythm NSR.  
 
0800 Per Zana Callaway NP, OK to give metoprolol before nuclear med stress test.  
 
0815 BP of 169/85 seen, will give AM meds and reassess BP.  
 
0930 BP reassessed and stable, 140/70. 4312 patient off floor for cardiac stress, tele notified. 1315 patient back on floor from cardiac stress, tele notified. 1600 discharge orders put in for patient. 1635 patient discharged, IVs dcd by [facility] and tele pack removed. Time for questions and answers provided. Patient discharged with 2 prescriptions.

## 2018-11-19 NOTE — DISCHARGE INSTRUCTIONS
Metoprolol (By injection)   Metoprolol (met-oh-PROE-lol)  Reduces risk of serious problems caused by a heart attack. This medicine is a beta-blocker. Brand Name(s):   There may be other brand names for this medicine. When This Medicine Should Not Be Used: You should not receive this medicine if you have had an allergic reaction to metoprolol or similar medicines (such as atenolol, propranolol, Bystolic®, Tenormin®). Make sure your doctor knows about any heart or blood circulation problems that you have, because you should not receive this medicine if you have certain problems. How to Use This Medicine:   Injectable  · Your doctor will prescribe your dose and schedule. This medicine is given through a needle placed in a vein. · A nurse or other health provider will give you this medicine. · You might be switched to an oral form of this medicine when your condition allows. Ask your doctor if you have questions about this. Drugs and Foods to Avoid:   Ask your doctor or pharmacist before using any other medicine, including over-the-counter medicines, vitamins, and herbal products. · Make sure your doctor knows if you are also using digoxin (Ruby Wale), dipyridamole (Persantine®), medicine to treat depression (such as bupropion, clomipramine, desipramine, fluoxetine, fluvoxamine, paroxetine, sertraline, Paxil®, Wellbutrin®), medicine to treat mental illness (such as chlorpromazine, fluphenazine, haloperidol, thioridazine, Mellaril®), medicine for heart rhythm problems (such as propafenone, Rythmol®), medicine to treat HIV or AIDS (such as ritonavir, Norvir®), diphenhydramine (Benadryl®), quinidine (Dellia Jose), hydroxychloroquine (Plaquenil®), or medicine to treat a fungus infection (such as terbinafine, Lamisil®).   · Make sure your doctor knows if you are also using another blood pressure medicine (such as clonidine (Catapres®), prazosin (Minipress®), reserpine, guanethidine (Ismelin®), hydralazine (Apresoline®), methyldopa, amlodipine, diltiazem, verapamil, Caduet®, Lotrel®), or an ergot medicine (such as ergotamine, Cafergot®, Ergomar®, Wigraine®). Warnings While Using This Medicine:   · Make sure your doctor knows if you are pregnant or breastfeeding, or if you have kidney disease, liver disease, blood vessel or circulation problems, diabetes, low blood pressure, lung disease (such as asthma), an overactive thyroid, or an adrenal gland tumor (pheochromocytoma). Tell your doctor if you have a history of allergies. · This medicine may worsen the symptoms of heart failure in some patients. Tell your doctor right away if you have more chest pain, trouble breathing, uneven heartbeat, rapid weight gain, or swelling of the face, fingers, feet, or lower legs. · Tell any doctor or dentist who treats you that you are using this medicine. You may need to stop using this medicine several days before you have surgery or medical tests. · This medicine may raise or lower your blood sugar level. If you have diabetes, report any changes in your blood sugar levels to your doctor. · This medicine may make you dizzy or drowsy. Avoid driving, using machines, or doing anything else that could be dangerous until you know how this medicine affects you.   Possible Side Effects While Using This Medicine:   Call your doctor right away if you notice any of these side effects:  · Allergic reaction: Itching or hives, swelling in your face or hands, swelling or tingling in your mouth or throat, chest tightness, trouble breathing  · Fever, chills, cough, sore throat, and body aches  · Lightheadedness, dizziness, or fainting  · Rapid weight gain, swelling in your hands, ankles, or feet  · Slow, fast, pounding, or uneven heartbeat  · Trouble breathing, cold sweats, and bluish-colored skin  · Unusual bleeding or bruising  · Worsening chest pain  If you notice these less serious side effects, talk with your doctor:   · Pain, itching, burning, swelling, or a lump under your skin where the needle is placed  If you notice other side effects that you think are caused by this medicine, tell your doctor. Call your doctor for medical advice about side effects. You may report side effects to FDA at 9-283-RXF-6219  © [facility] Information is for End User's use only and may not be sold, redistributed or otherwise used for commercial purposes. The above information is an  only. It is not intended as medical advice for individual conditions or treatments. Talk to your doctor, nurse or pharmacist before following any medical regimen to see if it is safe and effective for you. Atorvastatin (By mouth)   Atorvastatin (a-tor-va-STAT-in)  Treats high cholesterol and triglyceride levels. Reduces the risk of angina, stroke, heart attack, or certain heart and blood vessel problems. This medicine is a statin. Brand Name(s): Lipitor   There may be other brand names for this medicine. When This Medicine Should Not Be Used: This medicine is not right for everyone. Do not use it if you had an allergic reaction to atorvastatin, if you have active liver disease, or if you are pregnant or breastfeeding. How to Use This Medicine:   Tablet  · Take your medicine as directed. Your dose may need to be changed several times to find what works best for you. · Take this medicine at the same time each day. · Swallow the tablet whole. Do not break it. · Missed dose: Take a dose as soon as you remember. If it is less than 12 hours until your next dose, skip the missed dose and take the next dose at the regular time. Do not take 2 doses of this medicine within 12 hours. · Read and follow the patient instructions that come with this medicine. Talk to your doctor or pharmacist if you have any questions. · Store the medicine in a closed container at room temperature, away from heat, moisture, and direct light.   Drugs and Foods to Avoid: Ask your doctor or pharmacist before using any other medicine, including over-the-counter medicines, vitamins, and herbal products. · Some medicines can affect how atorvastatin works. Tell your doctor if you also use birth control pills, boceprevir, cimetidine, colchicine, cyclosporine, digoxin, niacin, rifampin, spironolactone, telaprevir, medicine to treat an infection, or medicine to treat HIV/AIDS. Warnings While Using This Medicine:   · It is not safe to take this medicine during pregnancy. It could harm an unborn baby. Tell your doctor right away if you become pregnant. · Tell your doctor if you have kidney disease, diabetes, muscle pain or weakness, thyroid problems, have recently had a stroke or TIA (transient ischemic attack), or have a history of liver disease. Tell your doctor if you drink grapefruit juice or drink alcohol regularly. · This medicine can cause muscle problems, which can lead to kidney problems. · Tell any doctor or dentist who treats you that you use this medicine. You may need to stop using it if you have surgery, have an injury, or develop serious health problems. · Your doctor will do lab tests at regular visits to check on the effects of this medicine. Keep all appointments. · Keep all medicine out of the reach of children. Never share your medicine with anyone.   Possible Side Effects While Using This Medicine:   Call your doctor right away if you notice any of these side effects:  · Allergic reaction: Itching or hives, swelling in your face or hands, swelling or tingling in your mouth or throat, chest tightness, trouble breathing  · Blistering, peeling, red skin rash  · Change in how much or how often you urinate  · Dark urine or pale stools, nausea, vomiting, loss of appetite, stomach pain, yellow skin or eyes  · Fever  · Muscle pain, tenderness, or weakness  · Unusual tiredness  If you notice these less serious side effects, talk with your doctor:   · Diarrhea  · Joint pain  If you notice other side effects that you think are caused by this medicine, tell your doctor. Call your doctor for medical advice about side effects. You may report side effects to FDA at 1-787-PZE-4980  ©  [facility] Information is for End User's use only and may not be sold, redistributed or otherwise used for commercial purposes. The above information is an  only. It is not intended as medical advice for individual conditions or treatments. Talk to your doctor, nurse or pharmacist before following any medical regimen to see if it is safe and effective for you. Patient Discharge Instructions    Nadine Duggan / [id number] : 1950    Admitted 2018 Discharged: 2018     Primary Diagnoses  Problem List as of 2018 Date Reviewed: 2018          Codes Class Noted - Resolved    Chest pain ICD-10-CM: R07.9  ICD-9-CM: 786.50  2018 - Present        * (Principal) Angina at rest [facility]) ICD-10-CM: I20.8  ICD-9-CM: 413.9  2018 - Present        Uncontrolled hypertension ICD-10-CM: I10  ICD-9-CM: 401.9  2018 - Present        H/O Nephrolithiasis ICD-10-CM: N20.0  ICD-9-CM: 592.0  11/15/2010 - Present        CAD (coronary artery disease), native coronary artery ICD-10-CM: I25.10  ICD-9-CM: 414.01  10/28/2010 - Present    Overview Signed 11/15/2010  3:14 PM by Kalli Reyes MD     a. Cath (): LM d20. LAD ost90, p95. RI p70. LCx p40. RCA p30; Right PDA ost30. EF 60% with mild mid/distal anterolat HK. No AVG/MR. Patent L SC.  b. CABG (, Vennie Awe): LIMA-LAD, SVG-Diag, SVG-RI.  c. Echo (): EF 60%. Mild MR/TR. PASP 35. Dyslipidemia ICD-10-CM: E78.5  ICD-9-CM: 272.4  10/28/2010 - Present    Overview Addendum 2017  9:19 AM by Kalli Reyes MD     a. FLP (): Tot 142, TG 51, HDL 46, LDL 86 (Lipitor 10mg qd). B.  FLP (10/11/11): Tot 158, TG 68, HDL 56, LDL 88 (Zocor 20mg qd). C.  FLP (17):  Tot 170, , HDL 56, LDL 94 (Zocor 20). RESOLVED: S/P CABG (status post coronary artery bypass graft) ICD-9-CM: V45.81  10/28/2010 - 11/28/2011              Take Home Medications         · It is important that you take the medication exactly as they are prescribed. · Keep your medication in the bottles provided by the pharmacist and keep a list of the medication names, dosages, and times to be taken in your wallet. · Do not take other medications without consulting your doctor. What to do at Home    Recommended diet: Cardiac Diet    Recommended activity: Activity as tolerated    If you experience worsening symptoms, please follow up with nearest ER. Follow-up with your PCP in 2 weeks        Information obtained by :  I understand that if any problems occur once I am at home I am to contact my physician. I understand and acknowledge receipt of the instructions indicated above.                                                                                                                                            Physician's or R.N.'s Signature                                                                  Date/Time                                                                                                                                              Patient or Representative Signature                                                          Date/Time

## 2018-11-19 NOTE — PROGRESS NOTES
Reason for Admission:   Angina at rest, chest pain RRAT Score:    7 Plan for utilizing home health:      Not opposed if recommended Likelihood of Readmission:  Green/Low Transition of Care Plan:     Pt lives with his wife Prince Kaiser. Pt lives in a one story home with 3 steps to enter the home. Pt drives and is independent with all of his ADLs. Pt's has prescription coverage under his insurance plan, he gets his prescriptions filled at [facility] on [address]. PCP - pt likes to go to [facility]. Pt has had home health before but cannot remember the name of the company. DME - none. Code 44 and Observation letters have been signed and are on the pt's chart. No other issues or concerns at this time. Thanks ALLEN Brandon Care Management Interventions PCP Verified by CM: Kaushik Mayes Signup: No 
Discharge Durable Medical Equipment: No 
Physical Therapy Consult: Yes Occupational Therapy Consult: No 
Speech Therapy Consult: No 
Current Support Network: Lives with Spouse Confirm Follow Up Transport: Family Plan discussed with Pt/Family/Caregiver: Yes Discharge Location Discharge Placement: Home

## 2018-11-19 NOTE — PROGRESS NOTES
[facility] Medical Progress Note NAME: Faye Nation :  1950 MRM:  [id number] Date/Time: 2018  2:54 PM 
 
  
Assessment and Plan:  
 
 77 yo hx of HTN, CAD s/p CABG, presented w/ chest pain concerning for angina   
 1) Angina at rest/CAD: has CABG 12 years ago, but stopped taking statin, BB. Angina with exertion for past week. Initial EKG and enzymes neg, and now with mild troponinemia but no further pain. Will undergo exercise stress test today. Metoprolol added back. Continue ASA. LDL not at goal, restarted high potency statin. Appreciate cardiology input 
  
2) Dyslipidemia: patient stopped simvastatin several months ago. LDL greater than 140. Restarted statin.   
3) Uncontrolled HTN: Better after restarting metoprolol. Use IV labetalol prn. May need titration Subjective: Chief Complaint:  Patient seen and examined. Chart reviewed. Denies any further chest pain ROS: 
(bold if positive, if negative) Tolerating PT  Tolerating Diet Objective:  
 
Last 24hrs VS reviewed since prior progress note. Most recent are: 
 
Visit Vitals /85 (BP 1 Location: Left arm, BP Patient Position: Sitting) Pulse 69 Temp 97.6 °F (36.4 °C) Resp 18 Ht 5' 9\" (1.753 m) Wt 82 kg (180 lb 12.4 oz) SpO2 92% BMI 26.70 kg/m² SpO2 Readings from Last 6 Encounters:  
18 92% 17 98% 17 98% No intake or output data in the 24 hours ending 18 5773 Physical Exam: 
 
Gen:  Well-developed, well-nourished, in no acute distress HEENT:  Pink conjunctivae, PERRL, hearing intact to voice, moist mucous membranes Neck:  Supple, without masses, thyroid non-tender Resp:  No accessory muscle use, clear breath sounds without wheezes rales or rhonchi 
Card:  No murmurs, normal S1, S2 without thrills, bruits or peripheral edema Abd:  Soft, non-tender, non-distended, normoactive bowel sounds are present, no palpable organomegaly and no detectable hernias Lymph:  No cervical or inguinal adenopathy Musc:  No cyanosis or clubbing Skin:  No rashes or ulcers, skin turgor is good Neuro:  Cranial nerves are grossly intact, no focal motor weakness, follows commands appropriately Psych:  Good insight, oriented to person, place and time, alert Telemetry reviewed:   normal sinus rhythm 
__________________________________________________________________ Medications Reviewed: (see below) Medications:  
 
Current Facility-Administered Medications Medication Dose Route Frequency  atorvastatin (LIPITOR) tablet 40 mg  40 mg Oral DAILY  labetalol (NORMODYNE;TRANDATE) injection 20 mg  20 mg IntraVENous Q4H PRN  
 nitroglycerin (NITROBID) 2 % ointment 0.5 Inch  0.5 Inch Topical BID PRN  
 metoprolol tartrate (LOPRESSOR) tablet 25 mg  25 mg Oral Q12H  
 sodium chloride (NS) flush 5-10 mL  5-10 mL IntraVENous Q8H  
 sodium chloride (NS) flush 5-10 mL  5-10 mL IntraVENous PRN  
 acetaminophen (TYLENOL) tablet 650 mg  650 mg Oral Q4H PRN  
 HYDROcodone-acetaminophen (NORCO) 5-325 mg per tablet 1 Tab  1 Tab Oral Q4H PRN  
 naloxone (NARCAN) injection 0.4 mg  0.4 mg IntraVENous PRN  
 diphenhydrAMINE (BENADRYL) injection 12.5 mg  12.5 mg IntraVENous Q4H PRN  
 ondansetron (ZOFRAN) injection 4 mg  4 mg IntraVENous Q6H PRN  
 docusate sodium (COLACE) capsule 100 mg  100 mg Oral BID  morphine injection 2 mg  2 mg IntraVENous Q4H PRN  
 enoxaparin (LOVENOX) injection 40 mg  40 mg SubCUTAneous Q24H  
 aspirin tablet 325 mg  325 mg Oral DAILY Lab Data Reviewed: (see below) Lab Review:  
 
Recent Labs 11/19/18 0223 11/18/18 
0335 11/17/18 2012 WBC 7.3 7.4 8.1 HGB 14.6 14.7 16.0 HCT 42.3 42.5 46.7  214 233 Recent Labs 11/19/18 0223 11/18/18 
0335 11/17/18 2012  139 139  
K 4.0 4.0 3.7  104 102 CO2 25 26 27 * 111* 155* BUN 14 14 17 CREA 0.89 0.72 0.87 CA 8.9 8.6 8.9 MG 2.1 2.1  --   
PHOS 3.5 3.5  --   
ALB  --  3.4* 3.8 TBILI  --  0.4 0.3 SGOT  --  21 22 ALT  --  26 31 INR  --   --  0.9 No results found for: Ilan Baptise No results for input(s): PH, PCO2, PO2, HCO3, FIO2 in the last 72 hours. Recent Labs 11/17/18 2012 INR 0.9 All Micro Results None I have reviewed notes of prior 24hr. Other pertinent lab: None Total time spent with patient: 35 min Care Plan discussed with: Patient, Family, Nursing Staff and Consultant/Specialist 
 
Discussed:  Care Plan and D/C Planning Prophylaxis:  Lovenox Disposition:  Home w/Family 
        
___________________________________________________ Attending Physician: Osorio Bolivar DO

## 2018-11-19 NOTE — PROGRESS NOTES
Occupational Therapy Note: 
 
Orders acknowledged and chart reviewed. Note per PT pt I with ambulation, and RN reports pt up ad jessica. Pt received supine in bed preparing for procedure. Reports no concerns for ADL performance or functional transfers, reports performing ADLs without assistance in room. Pt feels he is at his functional baseline, politely declined need for OT services at this time. Will complete orders. Please reconsult if functional status changes. Thank you.  
 
Camille Dasilva OTR/L

## 2018-11-19 NOTE — DISCHARGE SUMMARY
Physician Discharge Summary     Patient ID:  Aleshia Cho  [id number]  76 y.o.  1950    Admit date: 11/17/2018    Discharge date and time: 11/19/2018    Admission Diagnoses: Angina at rest [facility])  Chest pain    Discharge Diagnoses:    Principal Diagnosis   Angina at rest [facility])                                             Other Diagnoses  Principal Problem:    Angina at rest [facility]) (11/17/2018)    Active Problems:    CAD (coronary artery disease), native coronary artery (10/28/2010)      Overview: a. Cath (11/27/7): LM d20. LAD ost90, p95. RI p70. LCx p40. RCA p30; Right       PDA ost30. EF 60% with mild mid/distal anterolat HK. No AVG/MR. Patent L       SC.      b. CABG (11/28/7, Chidi Chimera): LIMA-LAD, SVG-Diag, SVG-RI.      c. Echo (4/14/8): EF 60%. Mild MR/TR. PASP 35. Dyslipidemia (10/28/2010)      Overview: a. FLP (11/4/9): Tot 142, TG 51, HDL 46, LDL 86 (Lipitor 10mg qd). B.  FLP (10/11/11): Tot 158, TG 68, HDL 56, LDL 88 (Zocor 20mg qd). C.  FLP (4/19/17): Tot 170, , HDL 56, LDL 94 (Zocor 20). Uncontrolled hypertension (4/13/2018)      Chest pain (11/18/2018)         Hospital Course:      72 yo hx of HTN, CAD s/p CABG, presented w/ chest pain concerning for angina      1) Angina at rest/CAD: has CABG 12 years ago, but stopped taking statin, BB.  Angina with exertion for past week.  Initial EKG and enzymes neg, and now with mild troponinemia but no further pain. Will undergo exercise stress test today and if normal, can be discharged home. Metoprolol added back and can be titrated to BP goal as outpatient. Continue ASA. LDL not at goal, restarted high potency statin. Appreciate cardiology input     2) Dyslipidemia: patient stopped simvastatin several months ago.  LDL greater than 140. Restarted statin.    3) Uncontrolled HTN: Better after restarting metoprolol and increasing dose.  May need titration as outpatient.       PCP: BRITTANI Cao    Consults: Cardiology    Significant Diagnostic Studies: See Hospital Course    Discharged home in improved condition. Discharge Exam:       Visit Vitals  /85 (BP 1 Location: Left arm, BP Patient Position: Sitting)   Pulse 69   Temp 97.6 °F (36.4 °C)   Resp 18   Ht 5' 9\" (1.753 m)   Wt 82 kg (180 lb 12.4 oz)   SpO2 92%   BMI 26.70 kg/m²          SpO2 Readings from Last 6 Encounters:   11/19/18 92%   04/14/17 98%   04/07/17 98%        No intake or output data in the 24 hours ending 11/19/18 8663      Physical Exam:     Gen:  Well-developed, well-nourished, in no acute distress  HEENT:  Pink conjunctivae, PERRL, hearing intact to voice, moist mucous membranes  Neck:  Supple, without masses, thyroid non-tender  Resp:  No accessory muscle use, clear breath sounds without wheezes rales or rhonchi  Card:  No murmurs, normal S1, S2 without thrills, bruits or peripheral edema  Abd:  Soft, non-tender, non-distended, normoactive bowel sounds are present, no palpable organomegaly and no detectable hernias  Lymph:  No cervical or inguinal adenopathy  Musc:  No cyanosis or clubbing  Skin:  No rashes or ulcers, skin turgor is good  Neuro:  Cranial nerves are grossly intact, no focal motor weakness, follows commands appropriately  Psych:  Good insight, oriented to person, place and time, alert      Disposition: home    Patient Instructions:   Current Discharge Medication List      START taking these medications    Details   atorvastatin (LIPITOR) 40 mg tablet Take 1 Tab by mouth daily. Qty: 30 Tab, Refills: 0      metoprolol tartrate (LOPRESSOR) 50 mg tablet Take 1 Tab by mouth every twelve (12) hours. Qty: 60 Tab, Refills: 0         CONTINUE these medications which have NOT CHANGED    Details   Omega-3 Fatty Acids (FISH OIL) 500 mg cap Take 1,000 mg by mouth daily. garlic cap Take 1 Cap by mouth daily.       OTHER Mixture of lemon,garlic, vinegar and christie - takes daily    Associated Diagnoses: Coronary artery disease involving native coronary artery of native heart without angina pectoris; Dyslipidemia      magnesium oxide (MAG-OX) 400 mg tablet Take 400 mg by mouth daily. Associated Diagnoses: Coronary artery disease involving native coronary artery with angina pectoris, unspecified whether native or transplanted heart ([facility] [address] Dyslipidemia      multivitamin (ONE A DAY) tablet Take 1 Tab by mouth daily. Associated Diagnoses: Coronary artery disease involving native coronary artery with angina pectoris, unspecified whether native or transplanted heart ([address] Dyslipidemia      ascorbic acid (VITAMIN C) 500 mg tablet Take 1,000 mg by mouth daily. Associated Diagnoses: CAD (coronary artery disease), native coronary artery      aspirin (ASPIRIN) 325 mg tablet Take 325 mg by mouth daily. Activity: Activity as tolerated  Diet: Resume previous diet  Wound Care: None needed    Follow-up with PCP in 2 weeks.       Signed:  Marivel Dejesus DO  11/19/2018  3:17 PM    Greater than 30 mins was spent in coordination, counseling, and execution of this patient's discharge

## 2018-11-19 NOTE — PROGRESS NOTES
Problem: Falls - Risk of 
Goal: *Absence of Falls Document Carmen Vivas Fall Risk and appropriate interventions in the flowsheet. Outcome: Progressing Towards Goal 
Fall Risk Interventions: 
Mobility Interventions: Assess mobility with egress test 
 
  
 
Medication Interventions: Evaluate medications/consider consulting pharmacy, Teach patient to arise slowly

## 2018-11-19 NOTE — PROGRESS NOTES
Bedside and Verbal shift change report given to Leobardo Aldana RN (oncoming nurse) by Acosta Gimenez RN (offgoing nurse). Report included the following information SBAR, Kardex, ED Summary, Intake/Output, Recent Results, Med Rec Status and Cardiac Rhythm NSR. Bedside and Verbal shift change report given to Denis Mcguire RN (oncoming nurse) by Leobardo Aldana RN (offgoing nurse). Report included the following information SBAR, Kardex, ED Summary, Intake/Output, Recent Results, Med Rec Status and Cardiac Rhythm NSR.

## 2018-12-13 ENCOUNTER — OFFICE VISIT (OUTPATIENT)
Dept: CARDIOLOGY CLINIC | Age: 68
End: 2018-12-13

## 2018-12-13 VITALS
DIASTOLIC BLOOD PRESSURE: 102 MMHG | HEIGHT: 69 IN | BODY MASS INDEX: 27.99 KG/M2 | HEART RATE: 64 BPM | WEIGHT: 189 LBS | SYSTOLIC BLOOD PRESSURE: 160 MMHG | RESPIRATION RATE: 16 BRPM

## 2018-12-13 DIAGNOSIS — I25.10 CORONARY ARTERY DISEASE INVOLVING NATIVE CORONARY ARTERY OF NATIVE HEART WITHOUT ANGINA PECTORIS: Primary | ICD-10-CM

## 2018-12-13 DIAGNOSIS — I10 ESSENTIAL HYPERTENSION: ICD-10-CM

## 2018-12-13 DIAGNOSIS — E78.5 DYSLIPIDEMIA: ICD-10-CM

## 2018-12-13 RX ORDER — ATORVASTATIN CALCIUM 40 MG/1
40 TABLET, FILM COATED ORAL DAILY
Qty: 90 TAB | Refills: 3 | Status: SHIPPED | OUTPATIENT
Start: 2018-12-13 | End: 2019-12-12 | Stop reason: SDUPTHER

## 2018-12-13 RX ORDER — METOPROLOL TARTRATE 50 MG/1
50 TABLET ORAL EVERY 12 HOURS
Qty: 180 TAB | Refills: 3 | Status: SHIPPED | OUTPATIENT
Start: 2018-12-13 | End: 2019-12-12 | Stop reason: SDUPTHER

## 2018-12-13 RX ORDER — LISINOPRIL 5 MG/1
5 TABLET ORAL DAILY
Qty: 30 TAB | Refills: 12 | Status: SHIPPED | OUTPATIENT
Start: 2018-12-13 | End: 2019-03-21

## 2018-12-13 NOTE — PROGRESS NOTES
Hannah Best MD. [facility] [place]              Patient: Aggie Eisenmenger  : 1950      Today's Date: 2018            HISTORY OF PRESENT ILLNESS:     History of Present Illness:  Here for follow-up. Says he is doing well. No complaints. No CP or SOB. Has some indigestion. No exertional CP. Shoveled snow without problems. BP is mildly high to at times normal at home. PAST MEDICAL HISTORY:     Past Medical History:   Diagnosis Date    CAD (coronary artery disease), native coronary artery 10/28/2010    Dyslipidemia 10/28/2010    HTN (hypertension)     Other and unspecified hyperlipidemia 10/28/2010    S/P CABG (status post coronary artery bypass graft) 10/28/2010    CABG (, Crissy Stevens): LIMA-LAD, SVG-Diag, SVG-RI.           MEDICATIONS:     Current Outpatient Medications   Medication Sig Dispense Refill    atorvastatin (LIPITOR) 40 mg tablet Take 1 Tab by mouth daily. 30 Tab 0    metoprolol tartrate (LOPRESSOR) 50 mg tablet Take 1 Tab by mouth every twelve (12) hours. 60 Tab 0    Omega-3 Fatty Acids (FISH OIL) 500 mg cap Take 1,000 mg by mouth daily.  garlic cap Take 1 Cap by mouth daily.  OTHER Mixture of lemon,garlic, vinegar and christie - takes daily      magnesium oxide (MAG-OX) 400 mg tablet Take 400 mg by mouth daily.  multivitamin (ONE A DAY) tablet Take 1 Tab by mouth daily.  ascorbic acid (VITAMIN C) 500 mg tablet Take 1,000 mg by mouth daily.  aspirin (ASPIRIN) 325 mg tablet Take 325 mg by mouth daily.          No Known Allergies          SOCIAL HISTORY:     Social History     Tobacco Use    Smoking status: Never Smoker    Smokeless tobacco: Never Used   Substance Use Topics    Alcohol use: No    Drug use: Not on file           FAMILY HISTORY:     Family History   Problem Relation Age of Onset    No Known Problems Mother     Hypertension Father                  REVIEW OF SYMPTOMS:      Review of Symptoms:  Constitutional: Negative for fever, chills  HEENT: Negative for nosebleeds, tinnitus, and vision changes. Respiratory: Negative for cough, wheezing  Cardiovascular: Negative for orthopnea, claudication, syncope, and PND. Gastrointestinal: Negative for abdominal pain, diarrhea, melena. Genitourinary: Negative for dysuria  Musculoskeletal: Negative for myalgias. Skin: Negative for rash  Heme: No problems bleeding. Neurological: Negative for speech change and focal weakness.                  PHYSICAL EXAM:      Physical Exam:  Visit Vitals  BP (!) 160/102 (BP 1 Location: Right arm, BP Patient Position: Sitting)   Pulse 64   Resp 16   Ht 5' 9\" (1.753 m)   Wt 189 lb (85.7 kg)   BMI 27.91 kg/m²       Patient appears generally well, mood and affect are appropriate and pleasant. HEENT:  Hearing intact, non-icteric, normocephalic, atraumatic. Neck Exam: Supple, No JVD    Lung Exam: Clear to auscultation, even breath sounds. Cardiac Exam: Regular rate and rhythm with no murmur  Abdomen: Soft, non-tender  Extremities: Moves all ext well. No lower extremity edema. Psych: Appropriate affect  Neuro - Grossly intact           LABS / OTHER STUDIES:      Lab Results   Component Value Date/Time    Sodium 137 11/19/2018 02:23 AM    Potassium 4.0 11/19/2018 02:23 AM    Chloride 103 11/19/2018 02:23 AM    CO2 25 11/19/2018 02:23 AM    Anion gap 9 11/19/2018 02:23 AM    Glucose 104 (H) 11/19/2018 02:23 AM    BUN 14 11/19/2018 02:23 AM    Creatinine 0.89 11/19/2018 02:23 AM    BUN/Creatinine ratio 16 11/19/2018 02:23 AM    GFR est AA >60 11/19/2018 02:23 AM    GFR est non-AA >60 11/19/2018 02:23 AM    Calcium 8.9 11/19/2018 02:23 AM    Bilirubin, total 0.4 11/18/2018 03:35 AM    AST (SGOT) 21 11/18/2018 03:35 AM    Alk.  phosphatase 77 11/18/2018 03:35 AM    Protein, total 6.9 11/18/2018 03:35 AM    Albumin 3.4 (L) 11/18/2018 03:35 AM    Globulin 3.5 11/18/2018 03:35 AM    A-G Ratio 1.0 (L) 11/18/2018 03:35 AM    ALT (SGPT) 26 11/18/2018 03:35 AM Lab Results   Component Value Date/Time    WBC 7.3 11/19/2018 02:23 AM    HGB 14.6 11/19/2018 02:23 AM    HCT 42.3 11/19/2018 02:23 AM    PLATELET 838 41/13/4731 02:23 AM    MCV 91.8 11/19/2018 02:23 AM       Lab Results   Component Value Date/Time    Cholesterol, total 219 (H) 11/18/2018 03:35 AM    HDL Cholesterol 57 11/18/2018 03:35 AM    LDL, calculated 147 (H) 11/18/2018 03:35 AM    VLDL, calculated 15 11/18/2018 03:35 AM    Triglyceride 75 11/18/2018 03:35 AM    CHOL/HDL Ratio 3.8 11/18/2018 03:35 AM       Lab Results   Component Value Date/Time    TSH 1.450 05/07/2018 12:31 PM                CARDIAC DIAGNOSTICS:      Cardiac Evaluation Includes:     Cath (11/27/7): LM d20. LAD ost90, p95. RI p70. LCx p40. RCA p30; Right PDA ost30. EF 60% with mild mid/distal anterolat HK. No AVG/MR. Patent L SC.    CABG (11/28/7, Fawn Zhao): LIMA-LAD, SVG-Diag, SVG-RI. Echo (4/14/8): EF 60%. Mild MR/TR. PASP 35.     Echo 11/17/18 - LVEF 55-60%    Exercise / [facility] 11/19/18 - Patient walked 8:42 min on treadmill (10 METS, no CP or ischemic EKG changes) but HR blunted due to BB so was switched to [place] study. MPI shows normal perfusion. LVEF 58%.         EKG 4/13/18 - NSR, normal   EKG 12/13/18 - NSR, incomplete RBBB           ASSESSMENT AND PLAN:      Assessment and Plan:  1) CAD (CABG 11/2007)   - Normal stress test 11/18   - Doing well without complaints since 11/18 admission   - Exercises regularly   - Cont ASA (81 mg daily), BB and a statin   - For tighter BP control, start an ACE-I     2) Dyslipidemia  - continue potent statin   - recheck labs      3) HTN   - BP very high in the office, but he says it is better at home  - start lisinopril 5 mg daily and follow BP at home - Goal < 130/80     4) See me back in 3 months. Patient expressed understanding of the plan - questions were answered. Son is EMT.   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  [address] [place] [address], [address]                 Claude, [address] [place] [address]  Ph: [phone]                               Ph [phone]

## 2018-12-13 NOTE — PROGRESS NOTES
Chief Complaint   Patient presents with   [facility] Follow Up    Chest Pain     Visit Vitals  BP (!) 160/102 (BP 1 Location: Right arm, BP Patient Position: Sitting)   Pulse 64   Resp 16   Ht 5' 9\" (1.753 m)   Wt 189 lb (85.7 kg)   BMI 27.91 kg/m²

## 2018-12-31 ENCOUNTER — HOSPITAL ENCOUNTER (OUTPATIENT)
Dept: LAB | Age: 68
Discharge: HOME | End: 2018-12-31
Payer: MEDICARE

## 2018-12-31 PROCEDURE — 80061 LIPID PANEL: CPT

## 2018-12-31 PROCEDURE — 36415 COLL VENOUS BLD VENIPUNCTURE: CPT

## 2018-12-31 PROCEDURE — 80053 COMPREHEN METABOLIC PANEL: CPT

## 2019-01-01 LAB
ALBUMIN SERPL-MCNC: 4.4 G/DL (ref 3.6–4.8)
ALBUMIN/GLOB SERPL: 1.6 {RATIO} (ref 1.2–2.2)
ALP SERPL-CCNC: 86 IU/L (ref 39–117)
ALT SERPL-CCNC: 32 IU/L (ref 0–44)
AST SERPL-CCNC: 23 IU/L (ref 0–40)
BILIRUB SERPL-MCNC: 0.9 MG/DL (ref 0–1.2)
BUN SERPL-MCNC: 18 MG/DL (ref 8–27)
BUN/CREAT SERPL: 20 (ref 10–24)
CALCIUM SERPL-MCNC: 9.7 MG/DL (ref 8.6–10.2)
CHLORIDE SERPL-SCNC: 98 MMOL/L (ref 96–106)
CHOLEST SERPL-MCNC: 165 MG/DL (ref 100–199)
CO2 SERPL-SCNC: 23 MMOL/L (ref 20–29)
CREAT SERPL-MCNC: 0.88 MG/DL (ref 0.76–1.27)
GLOBULIN SER CALC-MCNC: 2.7 G/DL (ref 1.5–4.5)
GLUCOSE SERPL-MCNC: 95 MG/DL (ref 65–99)
HDLC SERPL-MCNC: 54 MG/DL
INTERPRETATION, 910389: NORMAL
LDLC SERPL CALC-MCNC: 91 MG/DL (ref 0–99)
POTASSIUM SERPL-SCNC: 4.6 MMOL/L (ref 3.5–5.2)
PROT SERPL-MCNC: 7.1 G/DL (ref 6–8.5)
SODIUM SERPL-SCNC: 137 MMOL/L (ref 134–144)
TRIGL SERPL-MCNC: 101 MG/DL (ref 0–149)
VLDLC SERPL CALC-MCNC: 20 MG/DL (ref 5–40)

## 2019-03-21 ENCOUNTER — OFFICE VISIT (OUTPATIENT)
Dept: CARDIOLOGY CLINIC | Age: 69
End: 2019-03-21

## 2019-03-21 VITALS
OXYGEN SATURATION: 97 % | BODY MASS INDEX: 27.99 KG/M2 | RESPIRATION RATE: 16 BRPM | HEART RATE: 68 BPM | HEIGHT: 69 IN | SYSTOLIC BLOOD PRESSURE: 132 MMHG | DIASTOLIC BLOOD PRESSURE: 86 MMHG | WEIGHT: 189 LBS

## 2019-03-21 DIAGNOSIS — E78.5 DYSLIPIDEMIA: ICD-10-CM

## 2019-03-21 DIAGNOSIS — I25.10 CORONARY ARTERY DISEASE INVOLVING NATIVE CORONARY ARTERY OF NATIVE HEART WITHOUT ANGINA PECTORIS: Primary | ICD-10-CM

## 2019-03-21 RX ORDER — LISINOPRIL 5 MG/1
5 TABLET ORAL DAILY
Qty: 90 TAB | Refills: 3 | Status: SHIPPED | OUTPATIENT
Start: 2019-03-21 | End: 2020-03-18

## 2019-03-21 RX ORDER — GUAIFENESIN 100 MG/5ML
81 LIQUID (ML) ORAL DAILY
Qty: 30 TAB | Refills: 0
Start: 2019-03-21

## 2019-03-21 NOTE — PROGRESS NOTES
Patricia Ritchie MD. [facility] [place]              Patient: Nidhi Estrada  : 1950      Today's Date: 3/21/2019            HISTORY OF PRESENT ILLNESS:     History of Present Illness:  Here for follow-up. Feels well. No CP or SOB. BP ~ 117/75 at home. No dizziness. No complaints. PAST MEDICAL HISTORY:     Past Medical History:   Diagnosis Date    CAD (coronary artery disease), native coronary artery 10/28/2010    Dyslipidemia 10/28/2010    HTN (hypertension)     Other and unspecified hyperlipidemia 10/28/2010    S/P CABG (status post coronary artery bypass graft) 10/28/2010    CABG (, Cindi Valenzuela): [place]-LAD, SVG-Diag, SVG-RI.         MEDICATIONS:     Current Outpatient Medications   Medication Sig Dispense Refill    aspirin 81 mg chewable tablet Take 1 Tab by mouth daily. 30 Tab 0    lisinopril (PRINIVIL, ZESTRIL) 5 mg tablet Take 1 Tab by mouth daily. 30 Tab 12    atorvastatin (LIPITOR) 40 mg tablet Take 1 Tab by mouth daily. 90 Tab 3    metoprolol tartrate (LOPRESSOR) 50 mg tablet Take 1 Tab by mouth every twelve (12) hours. 180 Tab 3    Omega-3 Fatty Acids (FISH OIL) 500 mg cap Take 1,000 mg by mouth daily.  magnesium oxide (MAG-OX) 400 mg tablet Take 400 mg by mouth daily.  multivitamin (ONE A DAY) tablet Take 1 Tab by mouth daily.  ascorbic acid (VITAMIN C) 500 mg tablet Take 1,000 mg by mouth daily. No Known Allergies        SOCIAL HISTORY:     Social History     Tobacco Use    Smoking status: Never Smoker    Smokeless tobacco: Never Used   Substance Use Topics    Alcohol use: No    Drug use: Not on file         FAMILY HISTORY:     Family History   Problem Relation Age of Onset    No Known Problems Mother     Hypertension Father             REVIEW OF SYMPTOMS:      Review of Symptoms:  Constitutional: Negative for fever, chills  HEENT: Negative for nosebleeds, tinnitus, and vision changes.    Respiratory: Negative for cough, wheezing  Cardiovascular: Negative for orthopnea, claudication, syncope, and PND. Gastrointestinal: Negative for abdominal pain, diarrhea, melena. Genitourinary: Negative for dysuria  Musculoskeletal: Negative for myalgias. Skin: Negative for rash  Heme: No problems bleeding. Neurological: Negative for speech change and focal weakness.                  PHYSICAL EXAM:      Physical Exam:  Visit Vitals  /86 (BP 1 Location: Left arm, BP Patient Position: Sitting)   Pulse 68   Resp 16   Ht 5' 9\" (1.753 m)   Wt 189 lb (85.7 kg)   SpO2 97%   BMI 27.91 kg/m²       Patient appears generally well, mood and affect are appropriate and pleasant. HEENT:  Hearing intact, non-icteric, normocephalic, atraumatic. Neck Exam: Supple, No JVD    Lung Exam: Clear to auscultation, even breath sounds. Cardiac Exam: Regular rate and rhythm with no murmur  Abdomen: Soft, non-tender  Extremities: Moves all ext well. No lower extremity edema. Psych: Appropriate affect  Neuro - Grossly intact           LABS / OTHER STUDIES:      Lab Results   Component Value Date/Time    Sodium 137 12/31/2018 12:38 PM    Potassium 4.6 12/31/2018 12:38 PM    Chloride 98 12/31/2018 12:38 PM    CO2 23 12/31/2018 12:38 PM    Anion gap 9 11/19/2018 02:23 AM    Glucose 95 12/31/2018 12:38 PM    BUN 18 12/31/2018 12:38 PM    Creatinine 0.88 12/31/2018 12:38 PM    BUN/Creatinine ratio 20 12/31/2018 12:38 PM    GFR est  12/31/2018 12:38 PM    GFR est non-AA 88 12/31/2018 12:38 PM    Calcium 9.7 12/31/2018 12:38 PM    Bilirubin, total 0.9 12/31/2018 12:38 PM    AST (SGOT) 23 12/31/2018 12:38 PM    Alk.  phosphatase 86 12/31/2018 12:38 PM    Protein, total 7.1 12/31/2018 12:38 PM    Albumin 4.4 12/31/2018 12:38 PM    Globulin 3.5 11/18/2018 03:35 AM    A-G Ratio 1.6 12/31/2018 12:38 PM    ALT (SGPT) 32 12/31/2018 12:38 PM       Lab Results   Component Value Date/Time    WBC 7.3 11/19/2018 02:23 AM    HGB 14.6 11/19/2018 02:23 AM    HCT 42.3 11/19/2018 02:23 AM    PLATELET 583 38/47/5660 02:23 AM    MCV 91.8 11/19/2018 02:23 AM       Lab Results   Component Value Date/Time    Cholesterol, total 165 12/31/2018 12:38 PM    HDL Cholesterol 54 12/31/2018 12:38 PM    LDL, calculated 91 12/31/2018 12:38 PM    VLDL, calculated 20 12/31/2018 12:38 PM    Triglyceride 101 12/31/2018 12:38 PM    CHOL/HDL Ratio 3.8 11/18/2018 03:35 AM             CARDIAC DIAGNOSTICS:      Cardiac Evaluation Includes:     Cath (11/27/7): LM d20. LAD ost90, p95. RI p70. LCx p40. RCA p30; Right PDA ost30. EF 60% with mild mid/distal anterolat HK. No AVG/MR. Patent L SC.     CABG (11/28/7, Sage Morales): LIMA-LAD, SVG-Diag, SVG-RI.     Echo (4/14/8): EF 60%. Mild MR/TR. PASP 35.     Echo 11/17/18 - LVEF 55-60%     Exercise / [place] Cardiolite 11/19/18 - Patient walked 8:42 min on treadmill (10 METS, no CP or ischemic EKG changes) but HR blunted due to BB so was switched to [place] study. MPI shows normal perfusion. LVEF 58%.             EKG 4/13/18 - NSR, normal   EKG 12/13/18 - NSR, incomplete RBBB           ASSESSMENT AND PLAN:      Assessment and Plan:  1) CAD (CABG 11/2007)   - Normal stress test 11/18   - Doing well without complaints since 11/18 admission   - Exercises regularly   - Cont ASA (81 mg daily), ACE-I, BB and a statin     2) Dyslipidemia  - continue potent statin   - recent labs look good      3) HTN   - BP high in the office, but he says it is better at home  - /75 at home he says   - cont meds      4) See me back in 12 months.  Patient expressed understanding of the plan - questions were answered.      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       Shelbi Giraldo MD, [address]  [address], [address]      [address]  [address]  [facility] [address] [facility], [address] [place] [address]  Ph: [phone]                                [phone]

## 2019-03-21 NOTE — PROGRESS NOTES
Chief Complaint   Patient presents with    Follow-up    Coronary Artery Disease    Hypertension    Cholesterol Problem     Visit Vitals  /86 (BP 1 Location: Left arm, BP Patient Position: Sitting)   Pulse 68   Resp 16   Ht 5' 9\" (1.753 m)   Wt 189 lb (85.7 kg)   SpO2 97%   BMI 27.91 kg/m²     Pt presents in office w/o cardiac complaint. No recent hospital visits. No refills needed at this time.

## 2019-12-13 RX ORDER — METOPROLOL TARTRATE 50 MG/1
TABLET ORAL
Qty: 180 TAB | Refills: 1 | Status: SHIPPED | OUTPATIENT
Start: 2019-12-13 | End: 2020-03-10 | Stop reason: SDUPTHER

## 2019-12-13 RX ORDER — ATORVASTATIN CALCIUM 40 MG/1
TABLET, FILM COATED ORAL
Qty: 90 TAB | Refills: 1 | Status: SHIPPED | OUTPATIENT
Start: 2019-12-13 | End: 2020-03-06 | Stop reason: SDUPTHER

## 2019-12-13 NOTE — TELEPHONE ENCOUNTER
Pharmacy confirmed. Patient is waiting at the pharmacy for medication. Patient will soon be out of the medication.  He wants someone to contact [address]      Phone:([phone] leave msg if no answer

## 2019-12-13 NOTE — TELEPHONE ENCOUNTER
Per Dr. Nate Christopher VO:  GWA:4/60/8282  Future Appointments   Date Time Provider Edi Selam   3/19/2020  4:20 PM Nya Cevallos MD [facility] SCHED     Requested Prescriptions     Signed Prescriptions Disp Refills    atorvastatin (LIPITOR) 40 mg tablet 90 Tab 1     Sig: TAKE 1 TABLET BY MOUTH ONCE DAILY     Authorizing Provider: Omayra Muse     Ordering User: Rita Yeung    metoprolol tartrate (LOPRESSOR) 50 mg tablet 180 Tab 1     Sig: TAKE 1 TABLET BY MOUTH EVERY 12 HOURS     Authorizing Provider: Omayra Muse     Ordering User: Rita Yeung

## 2020-03-09 RX ORDER — ATORVASTATIN CALCIUM 40 MG/1
TABLET, FILM COATED ORAL
Qty: 90 TAB | Refills: 3 | Status: SHIPPED | OUTPATIENT
Start: 2020-03-09 | End: 2021-03-09 | Stop reason: SDUPTHER

## 2020-03-09 NOTE — TELEPHONE ENCOUNTER
Per VO of Dr. Kermit Chacko: 3/21/2019    Future Appointments   Date Time Provider Edi Doss   3/19/2020  4:20 PM Sandra Perez MD [facility] SCHED       Requested Prescriptions     Signed Prescriptions Disp Refills    atorvastatin (LIPITOR) 40 mg tablet 90 Tab 3     Sig: TAKE 1 TABLET BY MOUTH ONCE DAILY     Authorizing Provider: Shellie Allen     Ordering User: Gurjit Berg

## 2020-03-10 DIAGNOSIS — E78.5 DYSLIPIDEMIA: ICD-10-CM

## 2020-03-10 DIAGNOSIS — I25.10 CORONARY ARTERY DISEASE INVOLVING NATIVE CORONARY ARTERY OF NATIVE HEART WITHOUT ANGINA PECTORIS: ICD-10-CM

## 2020-03-13 NOTE — TELEPHONE ENCOUNTER
Per Dr. Libia Thompson VO:  QDH:1/06/0780  No future appointments.   Requested Prescriptions     Signed Prescriptions Disp Refills    metoprolol tartrate (LOPRESSOR) 50 mg tablet 180 Tab 3     Sig: TAKE 1 TABLET BY MOUTH EVERY 12 HOURS     Authorizing Provider: Ricky Sapp     Ordering User: Celia Draper

## 2020-03-16 NOTE — TELEPHONE ENCOUNTER
Per Dr. Jonah Gtz VO:  XMJ:3/22/4892  Future Appointments   Date Time Provider Edi Selam   3/19/2020  4:20 PM Celso Juarez MD [facility] SCHED     Requested Prescriptions     Signed Prescriptions Disp Refills    lisinopriL (PRINIVIL, ZESTRIL) 5 mg tablet 90 Tab 3     Sig: Take 1 tablet by mouth once daily     Authorizing Provider: Dale Whitfield     Ordering User: Nikko Calles

## 2020-03-18 RX ORDER — LISINOPRIL 5 MG/1
TABLET ORAL
Qty: 90 TAB | Refills: 3 | Status: SHIPPED | OUTPATIENT
Start: 2020-03-18 | End: 2021-03-09 | Stop reason: SDUPTHER

## 2020-03-19 ENCOUNTER — OFFICE VISIT (OUTPATIENT)
Dept: CARDIOLOGY CLINIC | Age: 70
End: 2020-03-19

## 2020-03-19 VITALS
WEIGHT: 182 LBS | HEIGHT: 69 IN | HEART RATE: 71 BPM | SYSTOLIC BLOOD PRESSURE: 130 MMHG | OXYGEN SATURATION: 96 % | BODY MASS INDEX: 26.96 KG/M2 | DIASTOLIC BLOOD PRESSURE: 78 MMHG

## 2020-03-19 DIAGNOSIS — E78.5 DYSLIPIDEMIA: ICD-10-CM

## 2020-03-19 DIAGNOSIS — I25.10 CORONARY ARTERY DISEASE INVOLVING NATIVE CORONARY ARTERY OF NATIVE HEART WITHOUT ANGINA PECTORIS: Primary | ICD-10-CM

## 2020-03-19 DIAGNOSIS — I10 ESSENTIAL HYPERTENSION: ICD-10-CM

## 2020-03-19 NOTE — PROGRESS NOTES
Chief Complaint   Patient presents with    Annual Exam    Coronary Artery Disease    Hypertension    Chest Pain (Angina)     Visit Vitals  /78 (BP 1 Location: Left arm, BP Patient Position: Sitting)   Pulse 71   Ht 5' 9\" (1.753 m)   Wt 182 lb (82.6 kg)   SpO2 96%   BMI 26.88 kg/m²       Chest pain denied   SOB denied   Swelling in hands/feet denied   Dizziness slightly every now; fatigue lately  Recent hospital stays denied   Refills requested for 4 refills instead of 3  Hx CABG

## 2020-03-19 NOTE — PROGRESS NOTES
Keri Paulino MD. [facility] [place]              Patient: Reinaldo Hill  : 1950      Today's Date: 3/19/2020            HISTORY OF PRESENT ILLNESS:     History of Present Illness:  Here for follow-up. No complaints. No CP or SOB. Doing well. PAST MEDICAL HISTORY:     Past Medical History:   Diagnosis Date    CAD (coronary artery disease), native coronary artery 10/28/2010    Dyslipidemia 10/28/2010    HTN (hypertension)     Other and unspecified hyperlipidemia 10/28/2010    S/P CABG (status post coronary artery bypass graft) 10/28/2010    CABG (, Leyla Reece): LIMA-LAD, SVG-Diag, SVG-RI.           MEDICATIONS:     Current Outpatient Medications   Medication Sig Dispense Refill    lisinopriL (PRINIVIL, ZESTRIL) 5 mg tablet Take 1 tablet by mouth once daily 90 Tab 3    atorvastatin (LIPITOR) 40 mg tablet TAKE 1 TABLET BY MOUTH ONCE DAILY 90 Tab 3    metoprolol tartrate (LOPRESSOR) 50 mg tablet TAKE 1 TABLET BY MOUTH EVERY 12 HOURS 180 Tab 1    aspirin 81 mg chewable tablet Take 1 Tab by mouth daily. 30 Tab 0    Omega-3 Fatty Acids (FISH OIL) 500 mg cap Take 1,000 mg by mouth daily.  magnesium oxide (MAG-OX) 400 mg tablet Take 400 mg by mouth daily.  multivitamin (ONE A DAY) tablet Take 1 Tab by mouth daily.  ascorbic acid (VITAMIN C) 500 mg tablet Take 1,000 mg by mouth daily. No Known Allergies          SOCIAL HISTORY:     Social History     Tobacco Use    Smoking status: Never Smoker    Smokeless tobacco: Never Used   Substance Use Topics    Alcohol use: No    Drug use: Not on file         FAMILY HISTORY:     Family History   Problem Relation Age of Onset    No Known Problems Mother     Hypertension Father           REVIEW OF SYMPTOMS:      Review of Symptoms:  Constitutional: Negative for fever, chills  HEENT: Negative for nosebleeds, tinnitus, and vision changes.    Respiratory: Negative for cough, wheezing  Cardiovascular: Negative for orthopnea, claudication, syncope, and PND. Gastrointestinal: Negative for abdominal pain, diarrhea, melena. Genitourinary: Negative for dysuria  Musculoskeletal: Negative for myalgias. Skin: Negative for rash  Heme: No problems bleeding. Neurological: Negative for speech change and focal weakness.                  PHYSICAL EXAM:      Physical Exam:  Visit Vitals  /78 (BP 1 Location: Left arm, BP Patient Position: Sitting)   Pulse 71   Ht 5' 9\" (1.753 m)   Wt 182 lb (82.6 kg)   SpO2 96%   BMI 26.88 kg/m²       Patient appears generally well, mood and affect are appropriate and pleasant. HEENT:  Hearing intact, non-icteric, normocephalic, atraumatic. Neck Exam: Supple, No JVD    Lung Exam: Clear to auscultation, even breath sounds. Cardiac Exam: Regular rate and rhythm with no murmur  Abdomen: Soft, non-tender  Extremities: Moves all ext well. No lower extremity edema. Psych: Appropriate affect  Neuro - Grossly intact           LABS / OTHER STUDIES:        Lab Results   Component Value Date/Time    Sodium 137 12/31/2018 12:38 PM    Potassium 4.6 12/31/2018 12:38 PM    Chloride 98 12/31/2018 12:38 PM    CO2 23 12/31/2018 12:38 PM    Anion gap 9 11/19/2018 02:23 AM    Glucose 95 12/31/2018 12:38 PM    BUN 18 12/31/2018 12:38 PM    Creatinine 0.88 12/31/2018 12:38 PM    BUN/Creatinine ratio 20 12/31/2018 12:38 PM    GFR est  12/31/2018 12:38 PM    GFR est non-AA 88 12/31/2018 12:38 PM    Calcium 9.7 12/31/2018 12:38 PM    Bilirubin, total 0.9 12/31/2018 12:38 PM    AST (SGOT) 23 12/31/2018 12:38 PM    Alk.  phosphatase 86 12/31/2018 12:38 PM    Protein, total 7.1 12/31/2018 12:38 PM    Albumin 4.4 12/31/2018 12:38 PM    Globulin 3.5 11/18/2018 03:35 AM    A-G Ratio 1.6 12/31/2018 12:38 PM    ALT (SGPT) 32 12/31/2018 12:38 PM       Lab Results   Component Value Date/Time    WBC 7.3 11/19/2018 02:23 AM    HGB 14.6 11/19/2018 02:23 AM    HCT 42.3 11/19/2018 02:23 AM    PLATELET 940 46/64/6847 02:23 AM    MCV 91.8 11/19/2018 02:23 AM     Lab Results   Component Value Date/Time    Cholesterol, total 165 12/31/2018 12:38 PM    HDL Cholesterol 54 12/31/2018 12:38 PM    LDL, calculated 91 12/31/2018 12:38 PM    VLDL, calculated 20 12/31/2018 12:38 PM    Triglyceride 101 12/31/2018 12:38 PM    CHOL/HDL Ratio 3.8 11/18/2018 03:35 AM              CARDIAC DIAGNOSTICS:      Cardiac Evaluation Includes:     Cath (11/27/7): LM d20. LAD ost90, p95. RI p70. LCx p40. RCA p30; Right PDA ost30. EF 60% with mild mid/distal anterolat HK. No AVG/MR. Patent L SC.     CABG (11/28/7, Clyde Fortune): LIMA-LAD, SVG-Diag, SVG-RI.     Echo (4/14/8): EF 60%. Mild MR/TR. PASP 35.     Echo 11/17/18 - LVEF 55-60%     Exercise / [place] - Patient walked 8:42 min on treadmill (10 METS, no CP or ischemic EKG changes) but HR blunted due to BB so was switched to [place] study. MPI shows normal perfusion. LVEF 58%.             EKG 4/13/18 - NSR, normal   EKG 12/13/18 - NSR, incomplete RBBB  EKG 3/19/20 - NSR, normal            ASSESSMENT AND PLAN:      Assessment and Plan:  1) CAD (CABG 11/2007)   - Normal stress test 11/18   - Doing well without complaints   - Exercises regularly   - Cont ASA (81 mg daily), ACE-I, BB and a statin      2) Dyslipidemia  - continue potent statin   - prior labs look good      3) HTN   - BP OK  - cont meds      4) See me back in 12 months.  Patient expressed understanding of the plan - questions were answered.      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 Is a [address], [place], [address], [address] [address] [address]  [address]  [address]  Ph: [phone]                                [phone]          ADDENDUM   10/29/2020  Labs 10/17/20 - CBC, CMP and lipids normal (LDL 65)  Will mail patient a copy

## 2020-03-23 ENCOUNTER — TELEPHONE (OUTPATIENT)
Dept: CARDIOLOGY CLINIC | Age: 70
End: 2020-03-23

## 2020-03-23 RX ORDER — METOPROLOL TARTRATE 50 MG/1
TABLET ORAL
Qty: 180 TAB | Refills: 3 | Status: SHIPPED | OUTPATIENT
Start: 2020-03-23 | End: 2021-03-09 | Stop reason: SDUPTHER

## 2020-10-17 ENCOUNTER — HOSPITAL ENCOUNTER (OUTPATIENT)
Dept: LAB | Age: 70
Discharge: HOME | End: 2020-10-17
Payer: MEDICARE

## 2020-10-17 PROCEDURE — 80053 COMPREHEN METABOLIC PANEL: CPT

## 2020-10-17 PROCEDURE — 80061 LIPID PANEL: CPT

## 2020-10-17 PROCEDURE — 85027 COMPLETE CBC AUTOMATED: CPT

## 2021-03-09 ENCOUNTER — OFFICE VISIT (OUTPATIENT)
Dept: CARDIOLOGY CLINIC | Age: 71
End: 2021-03-09
Payer: MEDICARE

## 2021-03-09 VITALS
DIASTOLIC BLOOD PRESSURE: 80 MMHG | SYSTOLIC BLOOD PRESSURE: 118 MMHG | BODY MASS INDEX: 26.81 KG/M2 | WEIGHT: 181 LBS | OXYGEN SATURATION: 100 % | HEIGHT: 69 IN | RESPIRATION RATE: 18 BRPM | HEART RATE: 65 BPM

## 2021-03-09 DIAGNOSIS — E78.5 DYSLIPIDEMIA: ICD-10-CM

## 2021-03-09 DIAGNOSIS — I25.10 CORONARY ARTERY DISEASE INVOLVING NATIVE CORONARY ARTERY OF NATIVE HEART WITHOUT ANGINA PECTORIS: Primary | ICD-10-CM

## 2021-03-09 DIAGNOSIS — I10 ESSENTIAL HYPERTENSION: ICD-10-CM

## 2021-03-09 PROCEDURE — G0463 HOSPITAL OUTPT CLINIC VISIT: HCPCS | Performed by: SPECIALIST

## 2021-03-09 PROCEDURE — G8419 CALC BMI OUT NRM PARAM NOF/U: HCPCS | Performed by: SPECIALIST

## 2021-03-09 PROCEDURE — 3017F COLORECTAL CA SCREEN DOC REV: CPT | Performed by: SPECIALIST

## 2021-03-09 PROCEDURE — G8432 DEP SCR NOT DOC, RNG: HCPCS | Performed by: SPECIALIST

## 2021-03-09 PROCEDURE — 1101F PT FALLS ASSESS-DOCD LE1/YR: CPT | Performed by: SPECIALIST

## 2021-03-09 PROCEDURE — 93005 ELECTROCARDIOGRAM TRACING: CPT | Performed by: SPECIALIST

## 2021-03-09 PROCEDURE — G8752 SYS BP LESS 140: HCPCS | Performed by: SPECIALIST

## 2021-03-09 PROCEDURE — G8536 NO DOC ELDER MAL SCRN: HCPCS | Performed by: SPECIALIST

## 2021-03-09 PROCEDURE — 99214 OFFICE O/P EST MOD 30 MIN: CPT | Performed by: SPECIALIST

## 2021-03-09 PROCEDURE — 93010 ELECTROCARDIOGRAM REPORT: CPT | Performed by: SPECIALIST

## 2021-03-09 PROCEDURE — G8754 DIAS BP LESS 90: HCPCS | Performed by: SPECIALIST

## 2021-03-09 PROCEDURE — G8427 DOCREV CUR MEDS BY ELIG CLIN: HCPCS | Performed by: SPECIALIST

## 2021-03-09 RX ORDER — CETIRIZINE HCL 10 MG
TABLET ORAL
COMMUNITY

## 2021-03-09 RX ORDER — LISINOPRIL 5 MG/1
5 TABLET ORAL DAILY
Qty: 90 TAB | Refills: 3 | Status: SHIPPED | OUTPATIENT
Start: 2021-03-09 | End: 2022-02-24

## 2021-03-09 RX ORDER — METOPROLOL TARTRATE 50 MG/1
TABLET ORAL
Qty: 180 TAB | Refills: 3 | Status: SHIPPED | OUTPATIENT
Start: 2021-03-09 | End: 2022-02-24

## 2021-03-09 RX ORDER — ATORVASTATIN CALCIUM 40 MG/1
TABLET, FILM COATED ORAL
Qty: 90 TAB | Refills: 3 | Status: SHIPPED | OUTPATIENT
Start: 2021-03-09 | End: 2022-02-24

## 2021-03-09 NOTE — PROGRESS NOTES
Delaney Yanez is a 79 y.o. male  Chief Complaint   Patient presents with    Follow-up     annual     Health Maintenance Due   Topic Date Due    Hepatitis C Screening  Never done    COVID-19 Vaccine (1 of 2) Never done    DTaP/Tdap/Td series (1 - Tdap) Never done    Shingrix Vaccine Age 50> (1 of 2) Never done    Colorectal Cancer Screening Combo  Never done    GLAUCOMA SCREENING Q2Y  Never done    Pneumococcal 65+ years (1 of 1 - PPSV23) Never done    Medicare Yearly Exam  Never done    Lipid Screen  12/31/2019    Flu Vaccine (1) Never done     Visit Vitals  /80   Pulse 65   Resp 18   Ht 5' 9\" (1.753 m)   Wt 181 lb (82.1 kg)   SpO2 100%   BMI 26.73 kg/m²

## 2021-03-09 NOTE — PROGRESS NOTES
Marissa Whiting MD. [facility] [place]              Patient: Debra Ferrer  : 1950      Today's Date: 3/9/2021          HISTORY OF PRESENT ILLNESS:     History of Present Illness:  Here for follow-up. Doing well. Exercises. No CP or SOB. He remains active. PAST MEDICAL HISTORY:     Past Medical History:   Diagnosis Date    CAD (coronary artery disease), native coronary artery 10/28/2010    Dyslipidemia 10/28/2010    HTN (hypertension)     Other and unspecified hyperlipidemia 10/28/2010    S/P CABG (status post coronary artery bypass graft) 10/28/2010    CABG (, Speedy Gary): [place]-LAD, SVG-Diag, SVG-RI.     \    MEDICATIONS:     Current Outpatient Medications   Medication Sig Dispense Refill    cetirizine (ZyrTEC) 10 mg tablet Take  by mouth.  metoprolol tartrate (LOPRESSOR) 50 mg tablet TAKE 1 TABLET BY MOUTH EVERY 12 HOURS 180 Tab 3    lisinopriL (PRINIVIL, ZESTRIL) 5 mg tablet Take 1 tablet by mouth once daily 90 Tab 3    atorvastatin (LIPITOR) 40 mg tablet TAKE 1 TABLET BY MOUTH ONCE DAILY 90 Tab 3    aspirin 81 mg chewable tablet Take 1 Tab by mouth daily. 30 Tab 0    multivitamin (ONE A DAY) tablet Take 1 Tab by mouth daily.  ascorbic acid (VITAMIN C) 500 mg tablet Take 1,000 mg by mouth daily. No Known Allergies        SOCIAL HISTORY:     Social History     Tobacco Use    Smoking status: Never Smoker    Smokeless tobacco: Never Used   Substance Use Topics    Alcohol use: No    Drug use: Not on file         FAMILY HISTORY:     Family History   Problem Relation Age of Onset    No Known Problems Mother     Hypertension Father           REVIEW OF SYMPTOMS:      Review of Symptoms:  Constitutional: Negative for fever, chills  HEENT: Negative for nosebleeds, tinnitus, and vision changes. Respiratory: Negative for cough, wheezing  Cardiovascular: Negative for orthopnea, claudication, syncope, and PND.    Gastrointestinal: Negative for abdominal pain, diarrhea, melena. Genitourinary: Negative for dysuria  Musculoskeletal: Negative for myalgias. Skin: Negative for rash  Heme: No problems bleeding. Neurological: Negative for speech change and focal weakness.                  PHYSICAL EXAM:      Physical Exam:  Visit Vitals  /80   Pulse 65   Resp 18   Ht 5' 9\" (1.753 m)   Wt 181 lb (82.1 kg)   SpO2 100%   BMI 26.73 kg/m²       Patient appears generally well, mood and affect are appropriate and pleasant. HEENT:  Hearing intact, non-icteric, normocephalic, atraumatic. Neck Exam: Supple, No JVD    Lung Exam: Clear to auscultation, even breath sounds. Cardiac Exam: Regular rate and rhythm with no murmur  Abdomen: Soft, non-tender  Extremities: Moves all ext well. No lower extremity edema. Psych: Appropriate affect  Neuro - Grossly intact           LABS / OTHER STUDIES:        Lab Results   Component Value Date/Time    Sodium 137 12/31/2018 12:38 PM    Potassium 4.6 12/31/2018 12:38 PM    Chloride 98 12/31/2018 12:38 PM    CO2 23 12/31/2018 12:38 PM    Anion gap 9 11/19/2018 02:23 AM    Glucose 95 12/31/2018 12:38 PM    BUN 18 12/31/2018 12:38 PM    Creatinine 0.88 12/31/2018 12:38 PM    BUN/Creatinine ratio 20 12/31/2018 12:38 PM    GFR est  12/31/2018 12:38 PM    GFR est non-AA 88 12/31/2018 12:38 PM    Calcium 9.7 12/31/2018 12:38 PM    Bilirubin, total 0.9 12/31/2018 12:38 PM    Alk.  phosphatase 86 12/31/2018 12:38 PM    Protein, total 7.1 12/31/2018 12:38 PM    Albumin 4.4 12/31/2018 12:38 PM    Globulin 3.5 11/18/2018 03:35 AM    A-G Ratio 1.6 12/31/2018 12:38 PM    ALT (SGPT) 32 12/31/2018 12:38 PM    AST (SGOT) 23 12/31/2018 12:38 PM     Lab Results   Component Value Date/Time    WBC 7.3 11/19/2018 02:23 AM    HGB 14.6 11/19/2018 02:23 AM    HCT 42.3 11/19/2018 02:23 AM    PLATELET 276 55/10/2914 02:23 AM    MCV 91.8 11/19/2018 02:23 AM       Lab Results   Component Value Date/Time    Cholesterol, total 165 12/31/2018 12:38 PM    HDL Cholesterol 54 12/31/2018 12:38 PM    LDL, calculated 91 12/31/2018 12:38 PM    VLDL, calculated 20 12/31/2018 12:38 PM    Triglyceride 101 12/31/2018 12:38 PM    CHOL/HDL Ratio 3.8 11/18/2018 03:35 AM                   CARDIAC DIAGNOSTICS:      Cardiac Evaluation Includes:     Cath (11/27/7): LM d20. LAD ost90, p95. RI p70. LCx p40. RCA p30; Right PDA ost30. EF 60% with mild mid/distal anterolat HK. No AVG/MR. Patent L SC.     CABG (11/28/7, Elva Gudinoadam): LIMA-LAD, SVG-Diag, SVG-RI.     Echo (4/14/8): EF 60%. Mild MR/TR. PASP 35.     Echo 11/17/18 - LVEF 55-60%     Exercise / Sanjay Mars - Patient walked 8:42 min on treadmill (10 METS, no CP or ischemic EKG changes) but HR blunted due to BB so was switched to [place] study. MPI shows normal perfusion. LVEF 58%.       Labs 10/17/20 - CBC, CMP and lipids normal (LDL 65)        EKG 4/13/18 - NSR, normal   EKG 12/13/18 - NSR, incomplete RBBB  EKG 3/19/20 - NSR, normal   EKG 3/9/21 - NSR, RSR V1           ASSESSMENT AND PLAN:      Assessment and Plan:  1) CAD (CABG 11/2007)   - Normal stress test 11/18   - Doing well without complaints   - Exercises regularly   - Cont ASA (81 mg daily), ACE-I, BB and a statin   - echo in one year      2) Dyslipidemia  - continue potent statin   - prior labs look good   - recheck labs      3) HTN   - BP OK  - cont meds      4) See me back in 12 months.  Patient expressed understanding of the plan - questions were answered.      Lives with wife. 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  Is a [address], [place], [address] [place], [address]      [address] [address].  [address]  Kimberlee [place], [address] [place] [address]  Ph: [phone]                               [phone]

## 2021-08-03 PROBLEM — I10 HTN (HYPERTENSION): Status: RESOLVED | Noted: 2021-08-03 | Resolved: 2021-08-03

## 2022-02-23 DIAGNOSIS — E78.5 DYSLIPIDEMIA: ICD-10-CM

## 2022-02-23 DIAGNOSIS — I25.10 CORONARY ARTERY DISEASE INVOLVING NATIVE CORONARY ARTERY OF NATIVE HEART WITHOUT ANGINA PECTORIS: ICD-10-CM

## 2022-02-24 RX ORDER — LISINOPRIL 5 MG/1
TABLET ORAL
Qty: 90 TABLET | Refills: 0 | Status: SHIPPED | OUTPATIENT
Start: 2022-02-24 | End: 2022-06-06

## 2022-02-24 RX ORDER — ATORVASTATIN CALCIUM 40 MG/1
TABLET, FILM COATED ORAL
Qty: 90 TABLET | Refills: 0 | Status: SHIPPED | OUTPATIENT
Start: 2022-02-24 | End: 2022-03-24

## 2022-02-24 RX ORDER — METOPROLOL TARTRATE 50 MG/1
TABLET ORAL
Qty: 180 TABLET | Refills: 0 | Status: SHIPPED | OUTPATIENT
Start: 2022-02-24 | End: 2022-06-06

## 2022-02-24 NOTE — TELEPHONE ENCOUNTER
Refill per VO of Dr. Walt Ryan  Last appt: 3/9/2021  Future Appointments   Date Time Provider Edi Doss   3/24/2022 11:00 AM OSMAR GRAVES   3/24/2022 11:40 AM MD BEAN PierreS BS AMB       Requested Prescriptions     Pending Prescriptions Disp Refills    atorvastatin (LIPITOR) 40 mg tablet [Pharmacy Med Name: Atorvastatin Calcium 40 MG Oral Tablet] 90 Tablet 0     Sig: Take 1 tablet by mouth once daily    lisinopriL (PRINIVIL, ZESTRIL) 5 mg tablet [Pharmacy Med Name: Lisinopril 5 MG Oral Tablet] 90 Tablet 0     Sig: Take 1 tablet by mouth once daily    metoprolol tartrate (LOPRESSOR) 50 mg tablet [Pharmacy Med Name: Metoprolol Tartrate 50 MG Oral Tablet] 180 Tablet 0     Sig: TAKE 1 TABLET BY MOUTH EVERY 12 HOURS

## 2022-03-18 PROBLEM — R07.9 CHEST PAIN: Status: ACTIVE | Noted: 2018-11-18

## 2022-03-18 PROBLEM — I20.8 ANGINA AT REST (HCC): Status: ACTIVE | Noted: 2018-11-17

## 2022-03-18 PROBLEM — I20.89 ANGINA AT REST: Status: ACTIVE | Noted: 2018-11-17

## 2022-03-19 PROBLEM — I10 UNCONTROLLED HYPERTENSION: Status: ACTIVE | Noted: 2018-04-13

## 2022-03-24 ENCOUNTER — OFFICE VISIT (OUTPATIENT)
Dept: CARDIOLOGY CLINIC | Age: 72
End: 2022-03-24
Payer: MEDICARE

## 2022-03-24 ENCOUNTER — ANCILLARY PROCEDURE (OUTPATIENT)
Dept: CARDIOLOGY CLINIC | Age: 72
End: 2022-03-24
Payer: MEDICARE

## 2022-03-24 VITALS
BODY MASS INDEX: 27.7 KG/M2 | SYSTOLIC BLOOD PRESSURE: 122 MMHG | WEIGHT: 187 LBS | HEIGHT: 69 IN | HEART RATE: 72 BPM | DIASTOLIC BLOOD PRESSURE: 70 MMHG | OXYGEN SATURATION: 97 %

## 2022-03-24 VITALS
HEIGHT: 69 IN | BODY MASS INDEX: 27.7 KG/M2 | WEIGHT: 187 LBS | DIASTOLIC BLOOD PRESSURE: 70 MMHG | SYSTOLIC BLOOD PRESSURE: 122 MMHG

## 2022-03-24 DIAGNOSIS — I10 ESSENTIAL HYPERTENSION: ICD-10-CM

## 2022-03-24 DIAGNOSIS — I25.10 CORONARY ARTERY DISEASE INVOLVING NATIVE CORONARY ARTERY OF NATIVE HEART WITHOUT ANGINA PECTORIS: Primary | ICD-10-CM

## 2022-03-24 DIAGNOSIS — I25.10 CORONARY ARTERY DISEASE INVOLVING NATIVE CORONARY ARTERY OF NATIVE HEART WITHOUT ANGINA PECTORIS: ICD-10-CM

## 2022-03-24 DIAGNOSIS — E78.5 DYSLIPIDEMIA: ICD-10-CM

## 2022-03-24 LAB
ECHO AO ASC DIAM: 3.9 CM
ECHO AO ASCENDING AORTA INDEX: 1.94 CM/M2
ECHO AO ROOT DIAM: 4 CM
ECHO AO ROOT INDEX: 1.99 CM/M2
ECHO AR MAX VEL PISA: 4 M/S
ECHO AV AREA PEAK VELOCITY: 3.6 CM2
ECHO AV AREA VTI: 4.2 CM2
ECHO AV AREA/BSA PEAK VELOCITY: 1.8 CM2/M2
ECHO AV AREA/BSA VTI: 2.1 CM2/M2
ECHO AV MEAN GRADIENT: 2 MMHG
ECHO AV MEAN VELOCITY: 0.7 M/S
ECHO AV PEAK GRADIENT: 5 MMHG
ECHO AV PEAK VELOCITY: 1.1 M/S
ECHO AV REGURGITANT PHT: 598.9 MILLISECOND
ECHO AV VELOCITY RATIO: 0.73
ECHO AV VTI: 20.5 CM
ECHO EST RA PRESSURE: 3 MMHG
ECHO LA DIAMETER INDEX: 2.14 CM/M2
ECHO LA DIAMETER: 4.3 CM
ECHO LA TO AORTIC ROOT RATIO: 1.08
ECHO LA VOL 2C: 63 ML (ref 18–58)
ECHO LA VOL 4C: 48 ML (ref 18–58)
ECHO LA VOLUME AREA LENGTH: 61 ML
ECHO LA VOLUME INDEX A2C: 31 ML/M2 (ref 16–34)
ECHO LA VOLUME INDEX A4C: 24 ML/M2 (ref 16–34)
ECHO LA VOLUME INDEX AREA LENGTH: 30 ML/M2 (ref 16–34)
ECHO LV E' LATERAL VELOCITY: 10 CM/S
ECHO LV E' SEPTAL VELOCITY: 7 CM/S
ECHO LV FRACTIONAL SHORTENING: 40 % (ref 28–44)
ECHO LV INTERNAL DIMENSION DIASTOLE INDEX: 2.64 CM/M2
ECHO LV INTERNAL DIMENSION DIASTOLIC: 5.3 CM (ref 4.2–5.9)
ECHO LV INTERNAL DIMENSION SYSTOLIC INDEX: 1.59 CM/M2
ECHO LV INTERNAL DIMENSION SYSTOLIC: 3.2 CM
ECHO LV IVSD: 0.8 CM (ref 0.6–1)
ECHO LV MASS 2D: 174.5 G (ref 88–224)
ECHO LV MASS INDEX 2D: 86.8 G/M2 (ref 49–115)
ECHO LV POSTERIOR WALL DIASTOLIC: 1 CM (ref 0.6–1)
ECHO LV RELATIVE WALL THICKNESS RATIO: 0.38
ECHO LVOT AREA: 4.9 CM2
ECHO LVOT AV VTI INDEX: 0.88
ECHO LVOT DIAM: 2.5 CM
ECHO LVOT MEAN GRADIENT: 2 MMHG
ECHO LVOT PEAK GRADIENT: 3 MMHG
ECHO LVOT PEAK VELOCITY: 0.8 M/S
ECHO LVOT STROKE VOLUME INDEX: 43.9 ML/M2
ECHO LVOT SV: 88.3 ML
ECHO LVOT VTI: 18 CM
ECHO MV A VELOCITY: 0.69 M/S
ECHO MV AREA PHT: 4.2 CM2
ECHO MV AREA VTI: 4.1 CM2
ECHO MV E DECELERATION TIME (DT): 180.7 MS
ECHO MV E VELOCITY: 0.7 M/S
ECHO MV E/A RATIO: 1.01
ECHO MV E/E' LATERAL: 7
ECHO MV E/E' RATIO (AVERAGED): 8.5
ECHO MV E/E' SEPTAL: 10
ECHO MV LVOT VTI INDEX: 1.19
ECHO MV MAX VELOCITY: 0.8 M/S
ECHO MV MEAN GRADIENT: 1 MMHG
ECHO MV MEAN VELOCITY: 0.5 M/S
ECHO MV PEAK GRADIENT: 2 MMHG
ECHO MV PRESSURE HALF TIME (PHT): 52.4 MS
ECHO MV VTI: 21.4 CM
ECHO PULMONARY ARTERY END DIASTOLIC PRESSURE: 2 MMHG
ECHO PV REGURGITANT MAX VELOCITY: 0.7 M/S
ECHO RIGHT VENTRICULAR SYSTOLIC PRESSURE (RVSP): 29 MMHG
ECHO RV FREE WALL PEAK S': 9 CM/S
ECHO RV INTERNAL DIMENSION: 4 CM
ECHO RV TAPSE: 1.7 CM (ref 1.5–2)
ECHO TV REGURGITANT MAX VELOCITY: 2.56 M/S
ECHO TV REGURGITANT PEAK GRADIENT: 26 MMHG

## 2022-03-24 PROCEDURE — 93306 TTE W/DOPPLER COMPLETE: CPT | Performed by: SPECIALIST

## 2022-03-24 PROCEDURE — G8536 NO DOC ELDER MAL SCRN: HCPCS | Performed by: SPECIALIST

## 2022-03-24 PROCEDURE — 99214 OFFICE O/P EST MOD 30 MIN: CPT | Performed by: SPECIALIST

## 2022-03-24 PROCEDURE — 93005 ELECTROCARDIOGRAM TRACING: CPT | Performed by: SPECIALIST

## 2022-03-24 PROCEDURE — G8432 DEP SCR NOT DOC, RNG: HCPCS | Performed by: SPECIALIST

## 2022-03-24 PROCEDURE — G8427 DOCREV CUR MEDS BY ELIG CLIN: HCPCS | Performed by: SPECIALIST

## 2022-03-24 PROCEDURE — 93010 ELECTROCARDIOGRAM REPORT: CPT | Performed by: SPECIALIST

## 2022-03-24 PROCEDURE — G8754 DIAS BP LESS 90: HCPCS | Performed by: SPECIALIST

## 2022-03-24 PROCEDURE — G8419 CALC BMI OUT NRM PARAM NOF/U: HCPCS | Performed by: SPECIALIST

## 2022-03-24 PROCEDURE — 3017F COLORECTAL CA SCREEN DOC REV: CPT | Performed by: SPECIALIST

## 2022-03-24 PROCEDURE — 1101F PT FALLS ASSESS-DOCD LE1/YR: CPT | Performed by: SPECIALIST

## 2022-03-24 PROCEDURE — G8752 SYS BP LESS 140: HCPCS | Performed by: SPECIALIST

## 2022-03-24 PROCEDURE — G0463 HOSPITAL OUTPT CLINIC VISIT: HCPCS | Performed by: SPECIALIST

## 2022-03-24 RX ORDER — OMEGA-3 FATTY ACIDS/FISH OIL 300-500 MG
CAPSULE ORAL
COMMUNITY

## 2022-03-24 RX ORDER — VITAMIN K2 40 MCG
TABLET ORAL
COMMUNITY

## 2022-03-24 RX ORDER — ATORVASTATIN CALCIUM 40 MG/1
TABLET, FILM COATED ORAL
Qty: 90 TABLET | Refills: 4 | Status: SHIPPED | OUTPATIENT
Start: 2022-03-24

## 2022-03-24 RX ORDER — GLUCOSAMINE SULFATE 1500 MG
POWDER IN PACKET (EA) ORAL DAILY
COMMUNITY

## 2022-03-24 RX ORDER — EZETIMIBE 10 MG/1
10 TABLET ORAL DAILY
Qty: 90 TABLET | Refills: 3 | Status: SHIPPED | OUTPATIENT
Start: 2022-03-24

## 2022-03-24 NOTE — PROGRESS NOTES
Tae Willingham is a 70 y.o. male    Visit Vitals  /70   Pulse 72   Ht 5' 9\" (1.753 m)   Wt 187 lb (84.8 kg)   SpO2 97%   BMI 27.62 kg/m²       Chief Complaint   Patient presents with    Coronary Artery Disease     Echo w Rubina Brush Hypertension    Other     DLD       Chest pain NO  SOB NO  Dizziness NO  Swelling NO  Recent hospital visit NO  Refills NO  COVID VACCINE STATUS YES  HAD COVID?  NO

## 2022-03-24 NOTE — PROGRESS NOTES
Nate Sheppard MD. [facility] [place], [place]              Patient: Praneeth Angulo  : 1950      Today's Date: 3/24/2022          HISTORY OF PRESENT ILLNESS:     History of Present Illness:  Here for follow-up. Doing well. Exercises. No CP or SOB. He remains active. BP looks good. PAST MEDICAL HISTORY:     Past Medical History:   Diagnosis Date    CAD (coronary artery disease), native coronary artery 10/28/2010    Dyslipidemia 10/28/2010    HTN (hypertension)     Other and unspecified hyperlipidemia 10/28/2010    S/P CABG (status post coronary artery bypass graft) 10/28/2010    CABG (, Liliana Giraldo): LIMA-LAD, SVG-Diag, SVG-RI.       MEDICATIONS:     Current Outpatient Medications   Medication Sig Dispense Refill    cholecalciferol (Vitamin D3) 25 mcg (1,000 unit) cap Take  by mouth daily.  fish oil-omega-3 fatty acids (Fish OiL) 300-500 mg cap Take  by mouth.  vitamin K2 40 mcg tab Take  by mouth.  atorvastatin (LIPITOR) 40 mg tablet Take 1 tablet by mouth once daily 90 Tablet 0    lisinopriL (PRINIVIL, ZESTRIL) 5 mg tablet Take 1 tablet by mouth once daily 90 Tablet 0    metoprolol tartrate (LOPRESSOR) 50 mg tablet TAKE 1 TABLET BY MOUTH EVERY 12 HOURS 180 Tablet 0    cetirizine (ZyrTEC) 10 mg tablet Take  by mouth.  aspirin 81 mg chewable tablet Take 1 Tab by mouth daily. 30 Tab 0    multivitamin (ONE A DAY) tablet Take 1 Tab by mouth daily.  ascorbic acid (VITAMIN C) 500 mg tablet Take 1,000 mg by mouth daily.          No Known Allergies        SOCIAL HISTORY:     Social History     Tobacco Use    Smoking status: Never Smoker    Smokeless tobacco: Never Used   Substance Use Topics    Alcohol use: No    Drug use: Not on file         FAMILY HISTORY:     Family History   Problem Relation Age of Onset    No Known Problems Mother     Hypertension Father           REVIEW OF SYMPTOMS:      Review of Symptoms:  Constitutional: Negative for fever, chills  HEENT: Negative for nosebleeds, tinnitus, and vision changes. Respiratory: Negative for cough, wheezing  Cardiovascular: Negative for orthopnea, claudication, syncope, and PND. Gastrointestinal: Negative for abdominal pain, diarrhea, melena. Genitourinary: Negative for dysuria  Musculoskeletal: Negative for myalgias. Skin: Negative for rash  Heme: No problems bleeding. Neurological: Negative for speech change and focal weakness.                  PHYSICAL EXAM:      Physical Exam:  Visit Vitals  /70   Pulse 72   Ht 5' 9\" (1.753 m)   Wt 187 lb (84.8 kg)   SpO2 97%   BMI 27.62 kg/m²       Patient appears generally well, mood and affect are appropriate and pleasant. HEENT:  Hearing intact, non-icteric, normocephalic, atraumatic. Neck Exam: Supple, No JVD    Lung Exam: Clear to auscultation, even breath sounds. Cardiac Exam: Regular rate and rhythm with no murmur  Abdomen: Soft, non-tender  Extremities: Moves all ext well. No lower extremity edema. Psych: Appropriate affect  Neuro - Grossly intact           LABS / OTHER STUDIES:        Lab Results   Component Value Date/Time    Sodium 139 07/12/2021 12:01 PM    Potassium 4.5 07/12/2021 12:01 PM    Chloride 105 07/12/2021 12:01 PM    CO2 27 07/12/2021 12:01 PM    Anion gap 7 07/12/2021 12:01 PM    Glucose 98 07/12/2021 12:01 PM    BUN 24 (H) 07/12/2021 12:01 PM    Creatinine 0.84 07/12/2021 12:01 PM    BUN/Creatinine ratio 29 (H) 07/12/2021 12:01 PM    GFR est AA >60 07/12/2021 12:01 PM    GFR est non-AA >60 07/12/2021 12:01 PM    Calcium 9.5 07/12/2021 12:01 PM    Bilirubin, total 0.8 07/12/2021 12:01 PM    Alk.  phosphatase 75 07/12/2021 12:01 PM    Protein, total 7.0 07/12/2021 12:01 PM    Albumin 3.8 07/12/2021 12:01 PM    Globulin 3.2 07/12/2021 12:01 PM    A-G Ratio 1.2 07/12/2021 12:01 PM    ALT (SGPT) 36 07/12/2021 12:01 PM    AST (SGOT) 26 07/12/2021 12:01 PM     Lab Results   Component Value Date/Time    WBC 5.5 07/12/2021 12:01 PM    HGB 15.1 07/12/2021 12:01 PM    HCT 44.3 07/12/2021 12:01 PM    PLATELET 392 65/63/8263 12:01 PM    MCV 94.1 07/12/2021 12:01 PM       Lab Results   Component Value Date/Time    Cholesterol, total 168 07/12/2021 12:01 PM    HDL Cholesterol 58 07/12/2021 12:01 PM    LDL, calculated 92 07/12/2021 12:01 PM    VLDL, calculated 18 07/12/2021 12:01 PM    Triglyceride 90 07/12/2021 12:01 PM    CHOL/HDL Ratio 2.9 07/12/2021 12:01 PM        Labs 10/17/20 - CBC, CMP and lipids normal (LDL 65)              CARDIAC DIAGNOSTICS:      Cardiac Evaluation Includes:     Cath (11/27/7): LM d20. LAD ost90, p95. RI p70. LCx p40. RCA p30; Right PDA ost30. EF 60% with mild mid/distal anterolat HK. No AVG/MR. Patent L SC.     CABG (11/28/7, Stiven Briones): LIMA-LAD, SVG-Diag, SVG-RI.     Echo (4/14/8): EF 60%. Mild MR/TR. PASP 35.     Echo 11/17/18 - LVEF 55-60%     Exercise / Dorethia Mejia - Patient walked 8:42 min on treadmill (10 METS, no CP or ischemic EKG changes) but HR blunted due to BB so was switched to [place] study. MPI shows normal perfusion. LVEF 58%.      Echo 3/24/22 - PRELIM - stable         EKG 4/13/18 - NSR, normal   EKG 12/13/18 - NSR, incomplete RBBB  EKG 3/19/20 - NSR, normal   EKG 3/9/21 - NSR, RSR V1   EKG 3/27/22 - NSR, normal           ASSESSMENT AND PLAN:      Assessment and Plan:  1) CAD (CABG 11/2007)   - Normal stress test 11/18   - Doing well without complaints   - Exercises regularly   - Cont ASA (81 mg daily), ACE-I, BB and a statin      2) Dyslipidemia  - continue potent statin ---> add Zetia 10 mg daily to atorvastatin 40 mg daily recheck labs in one month      3) HTN   - BP OK  - cont meds      4) See me back in 12 months.  Patient expressed understanding of the plan - questions were answered.      Lives with wife.   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  Is a [address] [place], [place] [address] [facility], [address]      [address] [address].  [address]  [facility], [address] [place] [address]  Ph: [phone]                                [phone]

## 2022-06-05 DIAGNOSIS — I25.10 CORONARY ARTERY DISEASE INVOLVING NATIVE CORONARY ARTERY OF NATIVE HEART WITHOUT ANGINA PECTORIS: ICD-10-CM

## 2022-06-05 DIAGNOSIS — E78.5 DYSLIPIDEMIA: ICD-10-CM

## 2022-06-06 RX ORDER — METOPROLOL TARTRATE 50 MG/1
TABLET ORAL
Qty: 180 TABLET | Refills: 3 | Status: SHIPPED | OUTPATIENT
Start: 2022-06-06

## 2022-06-06 RX ORDER — LISINOPRIL 5 MG/1
TABLET ORAL
Qty: 90 TABLET | Refills: 3 | Status: SHIPPED | OUTPATIENT
Start: 2022-06-06

## 2022-06-06 NOTE — TELEPHONE ENCOUNTER
Patient is requesting a refill on his lisinopril and metoprolol tartrate. Patient is completely out of his medication. Pharmacy also sent over a request.    [phone]

## 2022-06-06 NOTE — TELEPHONE ENCOUNTER
Refill per VO of Dr. Geovanna Singer  Last appt: 3/24/2022  Future Appointments   Date Time Provider Edi Selam   3/23/2023  1:00 PM Gian Ferreira MD [facility] BS AMB       Requested Prescriptions     Signed Prescriptions Disp Refills    lisinopriL (PRINIVIL, ZESTRIL) 5 mg tablet 90 Tablet 3     Sig: Take 1 tablet by mouth once daily     Authorizing Provider: Shaun Styles     Ordering User: Mariia James    metoprolol tartrate (LOPRESSOR) 50 mg tablet 180 Tablet 3     Sig: TAKE 1 TABLET BY MOUTH EVERY 12 HOURS     Authorizing Provider: Shaun Styles     Ordering User: Mariia James

## 2023-03-23 ENCOUNTER — OFFICE VISIT (OUTPATIENT)
Dept: CARDIOLOGY CLINIC | Age: 73
End: 2023-03-23
Payer: MEDICARE

## 2023-03-23 VITALS
DIASTOLIC BLOOD PRESSURE: 80 MMHG | BODY MASS INDEX: 27.11 KG/M2 | SYSTOLIC BLOOD PRESSURE: 126 MMHG | HEART RATE: 70 BPM | WEIGHT: 183 LBS | HEIGHT: 69 IN | OXYGEN SATURATION: 97 %

## 2023-03-23 DIAGNOSIS — I10 UNCONTROLLED HYPERTENSION: ICD-10-CM

## 2023-03-23 DIAGNOSIS — I10 ESSENTIAL HYPERTENSION: ICD-10-CM

## 2023-03-23 DIAGNOSIS — I25.10 CORONARY ARTERY DISEASE INVOLVING NATIVE CORONARY ARTERY OF NATIVE HEART WITHOUT ANGINA PECTORIS: Primary | ICD-10-CM

## 2023-03-23 PROCEDURE — 93005 ELECTROCARDIOGRAM TRACING: CPT | Performed by: SPECIALIST

## 2023-03-23 PROCEDURE — G0463 HOSPITAL OUTPT CLINIC VISIT: HCPCS | Performed by: SPECIALIST

## 2023-03-23 NOTE — PROGRESS NOTES
Jayshree Muniz MD. [facility] [place]              Patient: April Paz  : 1950      Today's Date: 3/23/2023          HISTORY OF PRESENT ILLNESS:     History of Present Illness:  Here for follow-up. Doing well. Exercises. No CP or SOB. He remains active. BP looks good. PAST MEDICAL HISTORY:     Past Medical History:   Diagnosis Date    CAD (coronary artery disease), native coronary artery 10/28/2010    Dyslipidemia 10/28/2010    HTN (hypertension)     Other and unspecified hyperlipidemia 10/28/2010    S/P CABG (status post coronary artery bypass graft) 10/28/2010    CABG (, [facility]): LIMA-LAD, SVG-Diag, SVG-RI.       MEDICATIONS:     Current Outpatient Medications   Medication Sig Dispense Refill    zinc 50 mg tab tablet Take  by mouth daily. lisinopriL (PRINIVIL, ZESTRIL) 5 mg tablet Take 1 tablet by mouth once daily 90 Tablet 3    metoprolol tartrate (LOPRESSOR) 50 mg tablet TAKE 1 TABLET BY MOUTH EVERY 12 HOURS 180 Tablet 3    cholecalciferol (VITAMIN D3) 25 mcg (1,000 unit) cap Take  by mouth daily. fish oil-omega-3 fatty acids (Fish OiL) 300-500 mg cap Take  by mouth.      vitamin K2 40 mcg tab Take  by mouth.      ezetimibe (ZETIA) 10 mg tablet Take 1 Tablet by mouth daily. 90 Tablet 3    atorvastatin (LIPITOR) 40 mg tablet Take 1 tablet by mouth once daily 90 Tablet 4    cetirizine (ZYRTEC) 10 mg tablet Take  by mouth. aspirin 81 mg chewable tablet Take 1 Tab by mouth daily. 30 Tab 0    ascorbic acid, vitamin C, (VITAMIN C) 500 mg tablet Take 1,000 mg by mouth daily. multivitamin (ONE A DAY) tablet Take 1 Tab by mouth daily.  (Patient not taking: Reported on 3/23/2023)         No Known Allergies        SOCIAL HISTORY:     Social History     Tobacco Use    Smoking status: Never    Smokeless tobacco: Never   Substance Use Topics    Alcohol use: No         FAMILY HISTORY:     Family History   Problem Relation Age of Onset    No Known Problems Mother Hypertension Father           REVIEW OF SYMPTOMS:      Review of Symptoms:  Constitutional: Negative for fever, chills  HEENT: Negative for nosebleeds, tinnitus, and vision changes. Respiratory: Negative for cough, wheezing  Cardiovascular: Negative for orthopnea, claudication, syncope, and PND. Gastrointestinal: Negative for abdominal pain, diarrhea, melena. Genitourinary: Negative for dysuria  Musculoskeletal: Negative for myalgias. Skin: Negative for rash  Heme: No problems bleeding. Neurological: Negative for speech change and focal weakness. PHYSICAL EXAM:      Physical Exam:  Visit Vitals  /80 (BP 1 Location: Left upper arm, BP Patient Position: Sitting, BP Cuff Size: Adult)   Pulse 70   Ht 5' 9\" (1.753 m)   Wt 183 lb (83 kg)   SpO2 97%   BMI 27.02 kg/m²       Patient appears generally well, mood and affect are appropriate and pleasant. HEENT:  Hearing intact, non-icteric, normocephalic, atraumatic. Neck Exam: Supple, No JVD    Lung Exam: Clear to auscultation, even breath sounds. Cardiac Exam: Regular rate and rhythm with no murmur  Abdomen: Soft, non-tender  Extremities: Moves all ext well. No lower extremity edema. Psych: Appropriate affect  Neuro - Grossly intact           LABS / OTHER STUDIES:        Lab Results   Component Value Date/Time    Sodium 137 01/12/2023 11:32 AM    Potassium 4.6 01/12/2023 11:32 AM    Chloride 104 01/12/2023 11:32 AM    CO2 29 01/12/2023 11:32 AM    Anion gap 4 (L) 01/12/2023 11:32 AM    Glucose 103 (H) 01/12/2023 11:32 AM    BUN 18 01/12/2023 11:32 AM    Creatinine 1.05 01/12/2023 11:32 AM    BUN/Creatinine ratio 17 01/12/2023 11:32 AM    GFR est AA >60 07/12/2021 12:01 PM    GFR est non-AA >60 07/12/2021 12:01 PM    Calcium 9.4 01/12/2023 11:32 AM    Bilirubin, total 0.6 01/12/2023 11:32 AM    Alk.  phosphatase 81 01/12/2023 11:32 AM    Protein, total 6.4 01/12/2023 11:32 AM    Albumin 3.5 01/12/2023 11:32 AM    Globulin 2.9 01/12/2023 11:32 AM    A-G Ratio 1.2 01/12/2023 11:32 AM    ALT (SGPT) 37 01/12/2023 11:32 AM    AST (SGOT) 23 01/12/2023 11:32 AM     Lab Results   Component Value Date/Time    WBC 7.4 01/12/2023 11:32 AM    HGB 14.8 01/12/2023 11:32 AM    HCT 45.2 01/12/2023 11:32 AM    PLATELET 854 61/58/2759 11:32 AM    MCV 97.8 01/12/2023 11:32 AM       Lab Results   Component Value Date/Time    Cholesterol, total 138 01/12/2023 11:32 AM    HDL Cholesterol 58 01/12/2023 11:32 AM    LDL, calculated 65.2 01/12/2023 11:32 AM    VLDL, calculated 14.8 01/12/2023 11:32 AM    Triglyceride 74 01/12/2023 11:32 AM    CHOL/HDL Ratio 2.4 01/12/2023 11:32 AM        Labs 10/17/20 - CBC, CMP and lipids normal (LDL 65)              CARDIAC DIAGNOSTICS:      Cardiac Evaluation Includes:     Cath (11/27/7): LM d20. LAD ost90, p95. RI p70. LCx p40. RCA p30; Right PDA ost30. EF 60% with mild mid/distal anterolat HK. No AVG/MR. Patent L SC.     CABG (11/28/7, Maya Khan): LIMA-LAD, SVG-Diag, SVG-RI. Echo (4/14/8): EF 60%. Mild MR/TR. PASP 35.     Echo 11/17/18 - LVEF 55-60%     Exercise / Eulis Jareth Cardiolite 11/19/18 - Patient walked 8:42 min on treadmill (10 METS, no CP or ischemic EKG changes) but HR blunted due to BB so was switched to Eulis Jareth study. MPI shows normal perfusion. LVEF 58%. Echo 3/24/22 - LVEF 55-60%        EKG 4/13/18 - NSR, normal   EKG 12/13/18 - NSR, incomplete RBBB  EKG 3/19/20 - NSR, normal   EKG 3/9/21 - NSR, RSR V1   EKG 3/27/22 - NSR, normal   EKG 3/23/23 - NSR, normal           ASSESSMENT AND PLAN:      Assessment and Plan:  1) CAD (CABG 11/2007)   - Normal stress test 11/18   - Doing well without complaints   - Exercises regularly   - Cont ASA (81 mg daily), ACE-I, BB and a statin      2) Dyslipidemia  - continue potent statin and zetia   - prior lipids OK      3) HTN   - BP OK  - cont meds      4) See me back in 12 months. Patient expressed understanding of the plan - questions were answered. Lives with wife.   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 Is a [employer]. Jayshree Muniz MD, [address]  [address] [facility], [address]      [place] [address]  [address]  [address]  Ph: [phone]                               Ph [phone]

## 2023-03-23 NOTE — PROGRESS NOTES
Corwin Nolasco is a 67 y.o. male    Vitals:    03/23/23 1247   BP: 126/80   BP 1 Location: Left upper arm   BP Patient Position: Sitting   BP Cuff Size: Adult   Pulse: 70   Height: 5' 9\" (1.753 m)   Weight: 183 lb (83 kg)   SpO2: 97%       Chief Complaint   Patient presents with    Coronary Artery Disease    Hypertension    Cholesterol Problem       Chest pain NO  SOB NO  Dizziness NO  Swelling NO  Recent hospital visit NO  Refills NO  COVID VACCINE YES  HAD COVID?  NO

## 2023-03-23 NOTE — LETTER
3/13/2023 4:48 PM    Mr. Pamela Meeks  [address] [address]          Dear Neftali Anthony,    Please fax us the most recent (within the last 12 months):  -Office note OR discharge summary  -Labs  -Cardiac testing (EKG, echo, stress test, cardiac cath, vascular study, etc)    so that we may update the patient's records for continuity of care.      Our fax number: [phone]    Patient:   Pamela Meeks  1950      Future Appointments   Date Time Provider Edi Doss   3/23/2023  1:00 PM Brayan Davis MD [facility] BS AMB             Sincerely,      Christiano Ling MD

## 2023-05-21 RX ORDER — METOPROLOL TARTRATE 50 MG/1
1 TABLET, FILM COATED ORAL EVERY 12 HOURS
COMMUNITY
Start: 2022-06-06 | End: 2023-05-25

## 2023-05-21 RX ORDER — LISINOPRIL 5 MG/1
1 TABLET ORAL DAILY
COMMUNITY
Start: 2022-06-06 | End: 2023-05-25

## 2023-05-21 RX ORDER — CETIRIZINE HYDROCHLORIDE 10 MG/1
TABLET ORAL
COMMUNITY

## 2023-05-21 RX ORDER — ASCORBIC ACID 500 MG
1000 TABLET ORAL DAILY
COMMUNITY

## 2023-05-21 RX ORDER — ATORVASTATIN CALCIUM 40 MG/1
1 TABLET, FILM COATED ORAL DAILY
COMMUNITY
Start: 2022-03-24 | End: 2023-05-25

## 2023-05-21 RX ORDER — ASPIRIN 81 MG/1
81 TABLET, CHEWABLE ORAL DAILY
COMMUNITY
Start: 2019-03-21

## 2023-05-21 RX ORDER — EZETIMIBE 10 MG/1
10 TABLET ORAL DAILY
COMMUNITY
Start: 2022-03-24

## 2023-05-25 RX ORDER — LISINOPRIL 5 MG/1
TABLET ORAL
Qty: 90 TABLET | Refills: 3 | Status: SHIPPED | OUTPATIENT
Start: 2023-05-25

## 2023-05-25 RX ORDER — METOPROLOL TARTRATE 50 MG/1
TABLET, FILM COATED ORAL
Qty: 180 TABLET | Refills: 3 | Status: SHIPPED | OUTPATIENT
Start: 2023-05-25

## 2023-05-25 RX ORDER — ATORVASTATIN CALCIUM 40 MG/1
TABLET, FILM COATED ORAL
Qty: 90 TABLET | Refills: 3 | Status: SHIPPED | OUTPATIENT
Start: 2023-05-25

## 2023-05-25 NOTE — TELEPHONE ENCOUNTER
Refill per VO of Dr. Vanessa Hurtado  Last appt: 3/23/2023    Future Appointments   Date Time Provider Annie Borges   3/28/2024  1:20 PM Claudell Sheehan, MD [facility] BS AMB       Requested Prescriptions     Signed Prescriptions Disp Refills    lisinopril (PRINIVIL;ZESTRIL) 5 MG tablet 90 tablet 3     Sig: Take 1 tablet by mouth once daily     Authorizing Provider: Renzo Arriaga     Ordering User: Jeri THOMASON    atorvastatin (LIPITOR) 40 MG tablet 90 tablet 3     Sig: Take 1 tablet by mouth once daily     Authorizing Provider: Renzo Arriaga     Ordering User: Jeri THOMASON    metoprolol tartrate (LOPRESSOR) 50 MG tablet 180 tablet 3     Sig: TAKE 1 TABLET BY MOUTH EVERY 12 HOURS     Authorizing Provider: Renzo Arriaga     Ordering User: Rosy Jesus

## 2023-06-01 RX ORDER — LISINOPRIL 5 MG/1
5 TABLET ORAL DAILY
Qty: 90 TABLET | Refills: 3 | Status: CANCELLED | OUTPATIENT
Start: 2023-06-01

## 2024-03-28 ENCOUNTER — OFFICE VISIT (OUTPATIENT)
Age: 74
End: 2024-03-28
Payer: MEDICARE

## 2024-03-28 VITALS
DIASTOLIC BLOOD PRESSURE: 86 MMHG | OXYGEN SATURATION: 96 % | HEART RATE: 75 BPM | WEIGHT: 187.2 LBS | BODY MASS INDEX: 27.73 KG/M2 | RESPIRATION RATE: 18 BRPM | HEIGHT: 69 IN | SYSTOLIC BLOOD PRESSURE: 138 MMHG

## 2024-03-28 DIAGNOSIS — E78.5 DYSLIPIDEMIA: ICD-10-CM

## 2024-03-28 DIAGNOSIS — I10 ESSENTIAL (PRIMARY) HYPERTENSION: ICD-10-CM

## 2024-03-28 DIAGNOSIS — I25.118 CORONARY ARTERY DISEASE OF NATIVE ARTERY OF NATIVE HEART WITH STABLE ANGINA PECTORIS (HCC): Primary | ICD-10-CM

## 2024-03-28 PROCEDURE — G8484 FLU IMMUNIZE NO ADMIN: HCPCS | Performed by: SPECIALIST

## 2024-03-28 PROCEDURE — 1123F ACP DISCUSS/DSCN MKR DOCD: CPT | Performed by: SPECIALIST

## 2024-03-28 PROCEDURE — 93010 ELECTROCARDIOGRAM REPORT: CPT | Performed by: SPECIALIST

## 2024-03-28 PROCEDURE — 3079F DIAST BP 80-89 MM HG: CPT | Performed by: SPECIALIST

## 2024-03-28 PROCEDURE — 3017F COLORECTAL CA SCREEN DOC REV: CPT | Performed by: SPECIALIST

## 2024-03-28 PROCEDURE — 99214 OFFICE O/P EST MOD 30 MIN: CPT | Performed by: SPECIALIST

## 2024-03-28 PROCEDURE — 3075F SYST BP GE 130 - 139MM HG: CPT | Performed by: SPECIALIST

## 2024-03-28 PROCEDURE — G8427 DOCREV CUR MEDS BY ELIG CLIN: HCPCS | Performed by: SPECIALIST

## 2024-03-28 PROCEDURE — 1036F TOBACCO NON-USER: CPT | Performed by: SPECIALIST

## 2024-03-28 PROCEDURE — G8419 CALC BMI OUT NRM PARAM NOF/U: HCPCS | Performed by: SPECIALIST

## 2024-03-28 PROCEDURE — 93005 ELECTROCARDIOGRAM TRACING: CPT | Performed by: SPECIALIST

## 2024-03-28 RX ORDER — EZETIMIBE 10 MG/1
10 TABLET ORAL DAILY
Qty: 90 TABLET | Refills: 3 | Status: SHIPPED | OUTPATIENT
Start: 2024-03-28

## 2024-03-28 RX ORDER — PROPRANOLOL HYDROCHLORIDE 80 MG/1
80 CAPSULE, EXTENDED RELEASE ORAL DAILY
Qty: 90 CAPSULE | Refills: 3 | Status: SHIPPED | OUTPATIENT
Start: 2024-03-28

## 2024-03-28 RX ORDER — ROSUVASTATIN CALCIUM 5 MG/1
5 TABLET, COATED ORAL DAILY
Qty: 90 TABLET | Refills: 3 | Status: SHIPPED | OUTPATIENT
Start: 2024-03-28

## 2024-03-28 NOTE — PROGRESS NOTES
Karrie Marie MD. [facility]          Patient: Fran Mistry  : 1950      Today's Date: 3/28/2024        HISTORY OF PRESENT ILLNESS:     History of Present Illness:    Doing OK - no sig CP or SOB.       PAST MEDICAL HISTORY:     Past Medical History:   Diagnosis Date    CAD (coronary artery disease), native coronary artery 10/28/2010    Dyslipidemia 10/28/2010    HTN (hypertension)     Other and unspecified hyperlipidemia 10/28/2010       History reviewed. No pertinent surgical history.          CURRENT MEDICATIONS:    .  Current Outpatient Medications   Medication Sig Dispense Refill    lisinopril (PRINIVIL;ZESTRIL) 5 MG tablet Take 1 tablet by mouth once daily 90 tablet 3    metoprolol tartrate (LOPRESSOR) 50 MG tablet TAKE 1 TABLET BY MOUTH EVERY 12 HOURS 180 tablet 3    ascorbic acid (VITAMIN C) 500 MG tablet Take 1 tablet by mouth daily      aspirin 81 MG chewable tablet Take 1 tablet by mouth daily      cetirizine (ZYRTEC) 10 MG tablet Take by mouth      vitamin D 25 MCG (1000 UT) CAPS Take by mouth daily      Menaquinone-7 40 MCG TABS Take by mouth (Patient not taking: Reported on 3/28/2024)       No current facility-administered medications for this visit.       No Known Allergies      SOCIAL HISTORY:     Social History     Tobacco Use    Smoking status: Never    Smokeless tobacco: Never   Substance Use Topics    Alcohol use: No         FAMILY HISTORY:     Family History   Problem Relation Age of Onset    Hypertension Father     No Known Problems Mother          REVIEW OF SYMPTOMS:     Review of Symptoms:  Constitutional: Negative for fever, chills  HEENT: Negative for nosebleeds, tinnitus, and vision changes.   Respiratory: Negative for cough, wheezing  Cardiovascular: Negative for orthopnea, claudication, syncope  Gastrointestinal: Negative for abdominal pain, diarrhea, melena.   Genitourinary: Negative for dysuria  Musculoskeletal: Negative for myalgias.   Skin: Negative for rash  Heme: No problems

## 2024-03-28 NOTE — PROGRESS NOTES
Chief Complaint   Patient presents with    Annual Exam    Coronary Artery Disease    Hypertension     Vitals:    03/28/24 1308   BP: 138/86   Site: Left Upper Arm   Position: Sitting   Cuff Size: Medium Adult   Pulse: 75   Resp: 18   SpO2: 96%   Weight: 84.9 kg (187 lb 3.2 oz)   Height: 1.753 m (5' 9\")     No active chest pain/SOB  No recent hospitalizations/urgent care visits  No refills needed    Patient does not remember ever starting the Zetia

## 2024-05-28 DIAGNOSIS — E78.5 DYSLIPIDEMIA: ICD-10-CM

## 2024-05-28 DIAGNOSIS — I10 ESSENTIAL (PRIMARY) HYPERTENSION: ICD-10-CM

## 2024-05-28 LAB
ALBUMIN SERPL-MCNC: 3.6 G/DL (ref 3.5–5)
ALBUMIN/GLOB SERPL: 1.1 (ref 1.1–2.2)
ALP SERPL-CCNC: 64 U/L (ref 45–117)
ALT SERPL-CCNC: 29 U/L (ref 12–78)
ANION GAP SERPL CALC-SCNC: 5 MMOL/L (ref 5–15)
AST SERPL-CCNC: 23 U/L (ref 15–37)
BILIRUB SERPL-MCNC: 1 MG/DL (ref 0.2–1)
BUN SERPL-MCNC: 21 MG/DL (ref 6–20)
BUN/CREAT SERPL: 20 (ref 12–20)
CALCIUM SERPL-MCNC: 9.4 MG/DL (ref 8.5–10.1)
CHLORIDE SERPL-SCNC: 105 MMOL/L (ref 97–108)
CHOLEST SERPL-MCNC: 115 MG/DL
CO2 SERPL-SCNC: 29 MMOL/L (ref 21–32)
CREAT SERPL-MCNC: 1.04 MG/DL (ref 0.7–1.3)
ERYTHROCYTE [DISTWIDTH] IN BLOOD BY AUTOMATED COUNT: 12.7 % (ref 11.5–14.5)
GLOBULIN SER CALC-MCNC: 3.2 G/DL (ref 2–4)
GLUCOSE SERPL-MCNC: 94 MG/DL (ref 65–100)
HCT VFR BLD AUTO: 42 % (ref 36.6–50.3)
HDLC SERPL-MCNC: 55 MG/DL
HDLC SERPL: 2.1 (ref 0–5)
HGB BLD-MCNC: 14.5 G/DL (ref 12.1–17)
LDLC SERPL CALC-MCNC: 44 MG/DL (ref 0–100)
MCH RBC QN AUTO: 32.2 PG (ref 26–34)
MCHC RBC AUTO-ENTMCNC: 34.5 G/DL (ref 30–36.5)
MCV RBC AUTO: 93.3 FL (ref 80–99)
NRBC # BLD: 0 K/UL (ref 0–0.01)
NRBC BLD-RTO: 0 PER 100 WBC
PLATELET # BLD AUTO: 197 K/UL (ref 150–400)
PMV BLD AUTO: 10.1 FL (ref 8.9–12.9)
POTASSIUM SERPL-SCNC: 4.2 MMOL/L (ref 3.5–5.1)
PROT SERPL-MCNC: 6.8 G/DL (ref 6.4–8.2)
RBC # BLD AUTO: 4.5 M/UL (ref 4.1–5.7)
SODIUM SERPL-SCNC: 139 MMOL/L (ref 136–145)
TRIGL SERPL-MCNC: 80 MG/DL
TSH SERPL DL<=0.05 MIU/L-ACNC: 1.52 UIU/ML (ref 0.36–3.74)
VLDLC SERPL CALC-MCNC: 16 MG/DL
WBC # BLD AUTO: 6.6 K/UL (ref 4.1–11.1)

## 2024-06-03 ENCOUNTER — OFFICE VISIT (OUTPATIENT)
Age: 74
End: 2024-06-03
Payer: MEDICARE

## 2024-06-03 VITALS
SYSTOLIC BLOOD PRESSURE: 130 MMHG | WEIGHT: 187 LBS | HEART RATE: 75 BPM | OXYGEN SATURATION: 96 % | BODY MASS INDEX: 27.7 KG/M2 | DIASTOLIC BLOOD PRESSURE: 88 MMHG | HEIGHT: 69 IN | RESPIRATION RATE: 18 BRPM

## 2024-06-03 DIAGNOSIS — E78.5 DYSLIPIDEMIA: ICD-10-CM

## 2024-06-03 DIAGNOSIS — I25.118 CORONARY ARTERY DISEASE OF NATIVE ARTERY OF NATIVE HEART WITH STABLE ANGINA PECTORIS (HCC): Primary | ICD-10-CM

## 2024-06-03 DIAGNOSIS — I10 ESSENTIAL (PRIMARY) HYPERTENSION: ICD-10-CM

## 2024-06-03 PROCEDURE — 99214 OFFICE O/P EST MOD 30 MIN: CPT

## 2024-06-03 PROCEDURE — 1123F ACP DISCUSS/DSCN MKR DOCD: CPT

## 2024-06-03 PROCEDURE — 3079F DIAST BP 80-89 MM HG: CPT

## 2024-06-03 PROCEDURE — 3075F SYST BP GE 130 - 139MM HG: CPT

## 2024-06-03 PROCEDURE — 3017F COLORECTAL CA SCREEN DOC REV: CPT

## 2024-06-03 PROCEDURE — 1036F TOBACCO NON-USER: CPT

## 2024-06-03 PROCEDURE — G8419 CALC BMI OUT NRM PARAM NOF/U: HCPCS

## 2024-06-03 PROCEDURE — G8427 DOCREV CUR MEDS BY ELIG CLIN: HCPCS

## 2024-06-03 NOTE — PROGRESS NOTES
Chief Complaint   Patient presents with    Coronary Artery Disease    Hypertension    Hyperlipidemia    Follow-up     5 week f/u     Vitals:    06/03/24 1113   BP: 130/88   Site: Left Upper Arm   Position: Sitting   Cuff Size: Medium Adult   Pulse: 75   Resp: 18   SpO2: 96%   Weight: 84.8 kg (187 lb)   Height: 1.753 m (5' 9\")     No chest pain/SOB noted  No recent hospitalizations/urgent care visits  No refills needed  
05/28/2024    VLDL 16 05/28/2024    CHOLHDLRATIO 2.1 05/28/2024           CARDIAC DIAGNOSTICS:     Cardiac Evaluation Includes:  I reviewed the test results below.    EKG 3/23/23 - NSR, normal   EKG 3/28/24 - NSR, normal       Cath (11/27/7): LM d20. LAD ost90, p95. RI p70. LCx p40. RCA p30; Right PDA ost30. EF 60% with mild mid/distal anterolat HK. No AVG/MR. Patent L SC.     CABG (11/28/7, Anmol): LIMA-LAD, SVG-Diag, SVG-RI.     Echo (4/14/8): EF 60%. Mild MR/TR. PASP 35.      Echo 11/17/18 - LVEF 55-60%      Exercise / Lexiscan Cardiolite 11/19/18 - Patient walked 8:42 min on treadmill (10 METS, no CP or ischemic EKG changes) but HR blunted due to BB so was switched to Lexiscan study.  MPI shows normal perfusion. LVEF 58%.      Echo 3/24/22 - LVEF 55-60%              ASSESSMENT AND PLAN:     Assessment and Plan:    1) CAD (CABG 11/2007)    - Normal stress test 11/18    - Doing well without complaints    - Exercises regularly - class AGRAWAL    - Cont ASA (81 mg daily), ACE-I, BB and a statin        2) Dyslipidemia  - he stopped his statin in 2023 due to side effects   --> He is willing to try Crestor 5 mg daily and Zetia 10 mg daily -- recheck labs later   - He prefers lower dose statins     - last LDL was 44 from 6/3/24      3) HTN    - BP OK at home    - cont meds     4) + tremor   --> asked him to see Neuro if problems persist   --> Will try switching from metoprolol to propanolol 80 mg daily to see if it helps tremors       5) See Dr. Marie in 1 year        Lives with wife (had minor stroke in 2/24).  Son is EMT.  Was manager for [employer].  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  Is a .       ROSA Read - NP    [facility]  [facility]  [address], [address]  [address] [address]  [place], [place]  [address]  [place] [address]  Ph: [phone]   Ph [phone]

## 2024-06-13 RX ORDER — LISINOPRIL 5 MG/1
TABLET ORAL
Qty: 90 TABLET | Refills: 3 | Status: SHIPPED | OUTPATIENT
Start: 2024-06-13

## 2024-06-13 NOTE — TELEPHONE ENCOUNTER
Refill per VO of Dr. Marie  Last appt: 3/28/2024    Future Appointments   Date Time Provider Department Center   6/12/2025 11:40 AM Karrie Marie MD [facility]       Requested Prescriptions     Signed Prescriptions Disp Refills    lisinopril (PRINIVIL;ZESTRIL) 5 MG tablet 90 tablet 3     Sig: Take 1 tablet by mouth once daily     Authorizing Provider: KARRIE MARIE     Ordering User: RIGO RENO

## 2025-02-24 RX ORDER — ROSUVASTATIN CALCIUM 5 MG/1
5 TABLET, COATED ORAL DAILY
Qty: 90 TABLET | Refills: 1 | Status: SHIPPED | OUTPATIENT
Start: 2025-02-24

## 2025-02-24 RX ORDER — EZETIMIBE 10 MG/1
10 TABLET ORAL DAILY
Qty: 90 TABLET | Refills: 1 | Status: SHIPPED | OUTPATIENT
Start: 2025-02-24

## 2025-02-24 RX ORDER — PROPRANOLOL HYDROCHLORIDE 80 MG/1
80 CAPSULE, EXTENDED RELEASE ORAL DAILY
Qty: 90 CAPSULE | Refills: 1 | Status: SHIPPED | OUTPATIENT
Start: 2025-02-24

## 2025-04-23 ENCOUNTER — TELEPHONE (OUTPATIENT)
Age: 75
End: 2025-04-23

## 2025-05-20 RX ORDER — LISINOPRIL 5 MG/1
5 TABLET ORAL DAILY
Qty: 90 TABLET | Refills: 0 | Status: SHIPPED | OUTPATIENT
Start: 2025-05-20

## 2025-05-20 NOTE — TELEPHONE ENCOUNTER
Refill per VO of Dr. Marie  Last appt: 6/3/2024    Future Appointments   Date Time Provider Department Center   7/2/2025 10:40 AM Kaitlin James, APRN - NP [facility]       Requested Prescriptions     Signed Prescriptions Disp Refills    lisinopril (PRINIVIL;ZESTRIL) 5 MG tablet 90 tablet 0     Sig: Take 1 tablet by mouth once daily     Authorizing Provider: MARIBEL MARIE     Ordering User: CATRINA GERONIMO

## 2025-06-27 NOTE — PROGRESS NOTES
Patient: Fran Mistry  : 1950    Primary Cardiologist: Karrie Marie MD. [facility]      Today's Date: 2025        HISTORY OF PRESENT ILLNESS:     History of Present Illness:  Presents today for follow-up. Lost around 30 lbs, cut out sugar and carbs. Feels well. Denies chest pain, shortness of breath, edema, palpitations.   Diagnosed with Parkinson's and trying to stay active.       PAST MEDICAL HISTORY:     Past Medical History:   Diagnosis Date    CAD (coronary artery disease), native coronary artery 10/28/2010    Dyslipidemia 10/28/2010    HTN (hypertension)     Other and unspecified hyperlipidemia 10/28/2010    Parkinson disease (HCC)        History reviewed. No pertinent surgical history.          CURRENT MEDICATIONS:    .  Current Outpatient Medications   Medication Sig Dispense Refill    carbidopa-levodopa (SINEMET)  MG per tablet Take 1 tablet by mouth 3 times daily      lisinopril (PRINIVIL;ZESTRIL) 5 MG tablet Take 1 tablet by mouth once daily 90 tablet 0    ezetimibe (ZETIA) 10 MG tablet Take 1 tablet by mouth once daily 90 tablet 1    rosuvastatin (CRESTOR) 5 MG tablet Take 1 tablet by mouth once daily 90 tablet 1    propranolol (INDERAL LA) 80 MG extended release capsule Take 1 capsule by mouth once daily 90 capsule 1    ascorbic acid (VITAMIN C) 500 MG tablet Take 1 tablet by mouth daily      aspirin 81 MG chewable tablet Take 1 tablet by mouth daily      vitamin D 25 MCG (1000 UT) CAPS Take by mouth daily      cetirizine (ZYRTEC) 10 MG tablet Take by mouth (Patient not taking: Reported on 2025)      Menaquinone-7 40 MCG TABS Take by mouth (Patient not taking: Reported on 2025)       No current facility-administered medications for this visit.       No Known Allergies      SOCIAL HISTORY:     Social History     Tobacco Use    Smoking status: Never    Smokeless tobacco: Never   Substance Use Topics    Alcohol use: No         FAMILY HISTORY:     Family History

## 2025-07-02 ENCOUNTER — OFFICE VISIT (OUTPATIENT)
Age: 75
End: 2025-07-02
Payer: MEDICARE

## 2025-07-02 VITALS
BODY MASS INDEX: 24.59 KG/M2 | WEIGHT: 166 LBS | DIASTOLIC BLOOD PRESSURE: 82 MMHG | HEIGHT: 69 IN | HEART RATE: 64 BPM | SYSTOLIC BLOOD PRESSURE: 130 MMHG | RESPIRATION RATE: 18 BRPM | OXYGEN SATURATION: 97 %

## 2025-07-02 DIAGNOSIS — E78.5 DYSLIPIDEMIA: ICD-10-CM

## 2025-07-02 DIAGNOSIS — I10 ESSENTIAL (PRIMARY) HYPERTENSION: ICD-10-CM

## 2025-07-02 DIAGNOSIS — I25.118 CORONARY ARTERY DISEASE OF NATIVE ARTERY OF NATIVE HEART WITH STABLE ANGINA PECTORIS: Primary | ICD-10-CM

## 2025-07-02 PROCEDURE — 3017F COLORECTAL CA SCREEN DOC REV: CPT

## 2025-07-02 PROCEDURE — 1159F MED LIST DOCD IN RCRD: CPT

## 2025-07-02 PROCEDURE — 1126F AMNT PAIN NOTED NONE PRSNT: CPT

## 2025-07-02 PROCEDURE — 1036F TOBACCO NON-USER: CPT

## 2025-07-02 PROCEDURE — 3079F DIAST BP 80-89 MM HG: CPT

## 2025-07-02 PROCEDURE — 1123F ACP DISCUSS/DSCN MKR DOCD: CPT

## 2025-07-02 PROCEDURE — G8420 CALC BMI NORM PARAMETERS: HCPCS

## 2025-07-02 PROCEDURE — 93010 ELECTROCARDIOGRAM REPORT: CPT

## 2025-07-02 PROCEDURE — G8427 DOCREV CUR MEDS BY ELIG CLIN: HCPCS

## 2025-07-02 PROCEDURE — 99214 OFFICE O/P EST MOD 30 MIN: CPT

## 2025-07-02 PROCEDURE — 3075F SYST BP GE 130 - 139MM HG: CPT

## 2025-07-02 PROCEDURE — 93005 ELECTROCARDIOGRAM TRACING: CPT

## 2025-07-02 PROCEDURE — 1160F RVW MEDS BY RX/DR IN RCRD: CPT

## 2025-07-02 RX ORDER — CARBIDOPA AND LEVODOPA 25; 100 MG/1; MG/1
1 TABLET ORAL 3 TIMES DAILY
COMMUNITY
Start: 2025-06-14

## 2025-07-02 NOTE — PROGRESS NOTES
had concerns including Coronary Artery Disease and Hypertension.    Vitals:    07/02/25 1026   BP: 130/82   BP Site: Left Upper Arm   Patient Position: Sitting   BP Cuff Size: Medium Adult   Pulse: 64   Resp: 18   SpO2: 97%   Weight: 75.3 kg (166 lb)   Height: 1.753 m (5' 9\")        Chest pain No    Refills No        1. Have you been to the ER, urgent care clinic since your last visit? No       Hospitalized since your last visit? No       Where?        Date?

## 2025-07-03 LAB
ALBUMIN SERPL-MCNC: 3.8 G/DL (ref 3.5–5)
ALBUMIN/GLOB SERPL: 1.2 (ref 1.1–2.2)
ALP SERPL-CCNC: 72 U/L (ref 45–117)
ALT SERPL-CCNC: 33 U/L (ref 12–78)
ANION GAP SERPL CALC-SCNC: 7 MMOL/L (ref 2–12)
AST SERPL-CCNC: 17 U/L (ref 15–37)
BILIRUB SERPL-MCNC: 0.8 MG/DL (ref 0.2–1)
BUN SERPL-MCNC: 22 MG/DL (ref 6–20)
BUN/CREAT SERPL: 21 (ref 12–20)
CALCIUM SERPL-MCNC: 9.4 MG/DL (ref 8.5–10.1)
CHLORIDE SERPL-SCNC: 103 MMOL/L (ref 97–108)
CHOLEST SERPL-MCNC: 124 MG/DL
CO2 SERPL-SCNC: 29 MMOL/L (ref 21–32)
CREAT SERPL-MCNC: 1.07 MG/DL (ref 0.7–1.3)
ERYTHROCYTE [DISTWIDTH] IN BLOOD BY AUTOMATED COUNT: 13.4 % (ref 11.5–14.5)
GLOBULIN SER CALC-MCNC: 3.2 G/DL (ref 2–4)
GLUCOSE SERPL-MCNC: 98 MG/DL (ref 65–100)
HCT VFR BLD AUTO: 41.1 % (ref 36.6–50.3)
HDLC SERPL-MCNC: 57 MG/DL
HDLC SERPL: 2.2 (ref 0–5)
HGB BLD-MCNC: 14 G/DL (ref 12.1–17)
LDLC SERPL CALC-MCNC: 54.4 MG/DL (ref 0–100)
MCH RBC QN AUTO: 31.4 PG (ref 26–34)
MCHC RBC AUTO-ENTMCNC: 34.1 G/DL (ref 30–36.5)
MCV RBC AUTO: 92.2 FL (ref 80–99)
NRBC # BLD: 0 K/UL (ref 0–0.01)
NRBC BLD-RTO: 0 PER 100 WBC
PLATELET # BLD AUTO: 210 K/UL (ref 150–400)
PMV BLD AUTO: 10 FL (ref 8.9–12.9)
POTASSIUM SERPL-SCNC: 4.3 MMOL/L (ref 3.5–5.1)
PROT SERPL-MCNC: 7 G/DL (ref 6.4–8.2)
RBC # BLD AUTO: 4.46 M/UL (ref 4.1–5.7)
SODIUM SERPL-SCNC: 139 MMOL/L (ref 136–145)
TRIGL SERPL-MCNC: 63 MG/DL
TSH SERPL DL<=0.05 MIU/L-ACNC: 1.87 UIU/ML (ref 0.36–3.74)
VLDLC SERPL CALC-MCNC: 12.6 MG/DL
WBC # BLD AUTO: 6.8 K/UL (ref 4.1–11.1)

## 2025-07-07 ENCOUNTER — RESULTS FOLLOW-UP (OUTPATIENT)
Age: 75
End: 2025-07-07

## 2025-08-14 RX ORDER — LISINOPRIL 5 MG/1
5 TABLET ORAL DAILY
Qty: 90 TABLET | Refills: 3 | Status: SHIPPED | OUTPATIENT
Start: 2025-08-14

## 2025-08-14 RX ORDER — PROPRANOLOL HYDROCHLORIDE 80 MG/1
80 CAPSULE, EXTENDED RELEASE ORAL DAILY
Qty: 90 CAPSULE | Refills: 3 | Status: SHIPPED | OUTPATIENT
Start: 2025-08-14

## 2025-08-14 RX ORDER — EZETIMIBE 10 MG/1
10 TABLET ORAL DAILY
Qty: 90 TABLET | Refills: 3 | Status: SHIPPED | OUTPATIENT
Start: 2025-08-14

## 2025-08-14 RX ORDER — ROSUVASTATIN CALCIUM 5 MG/1
5 TABLET, COATED ORAL DAILY
Qty: 90 TABLET | Refills: 3 | Status: SHIPPED | OUTPATIENT
Start: 2025-08-14